# Patient Record
Sex: FEMALE | Race: WHITE | NOT HISPANIC OR LATINO | Employment: OTHER
[De-identification: names, ages, dates, MRNs, and addresses within clinical notes are randomized per-mention and may not be internally consistent; named-entity substitution may affect disease eponyms.]

---

## 2020-11-17 ENCOUNTER — RECORDS - HEALTHEAST (OUTPATIENT)
Dept: ADMINISTRATIVE | Facility: OTHER | Age: 30
End: 2020-11-17

## 2020-12-14 ENCOUNTER — RECORDS - HEALTHEAST (OUTPATIENT)
Dept: LAB | Facility: CLINIC | Age: 30
End: 2020-12-14

## 2020-12-15 LAB
ALLERGIC TO PENICILLIN: NO
GP B STREP DNA SPEC QL NAA+PROBE: NEGATIVE

## 2021-01-04 ENCOUNTER — RECORDS - HEALTHEAST (OUTPATIENT)
Dept: ADMINISTRATIVE | Facility: OTHER | Age: 31
End: 2021-01-04

## 2021-01-07 ENCOUNTER — ANESTHESIA - HEALTHEAST (OUTPATIENT)
Dept: OBGYN | Facility: CLINIC | Age: 31
End: 2021-01-07

## 2021-01-10 ENCOUNTER — HOME CARE/HOSPICE - HEALTHEAST (OUTPATIENT)
Dept: HOME HEALTH SERVICES | Facility: HOME HEALTH | Age: 31
End: 2021-01-10

## 2021-01-11 ENCOUNTER — HOME CARE/HOSPICE - HEALTHEAST (OUTPATIENT)
Dept: HOME HEALTH SERVICES | Facility: HOME HEALTH | Age: 31
End: 2021-01-11

## 2021-05-27 VITALS
RESPIRATION RATE: 16 BRPM | DIASTOLIC BLOOD PRESSURE: 72 MMHG | HEART RATE: 70 BPM | SYSTOLIC BLOOD PRESSURE: 134 MMHG | OXYGEN SATURATION: 98 % | TEMPERATURE: 98.1 F

## 2021-05-30 ENCOUNTER — HEALTH MAINTENANCE LETTER (OUTPATIENT)
Age: 31
End: 2021-05-30

## 2021-06-14 NOTE — ANESTHESIA POSTPROCEDURE EVALUATION
Patient: Yoly Adams  * No procedures listed *  Anesthesia type: No value filed.    Patient location: room  Last vitals: No vitals data found for the desired time range.    Post vital signs: stable  Level of consciousness: awake and responds to simple questions  Post-anesthesia pain: pain controlled  Post-anesthesia nausea and vomiting: no  Pulmonary: unassisted, return to baseline  Cardiovascular: stable and blood pressure at baseline  Hydration: adequate  Anesthetic events: no    QCDR Measures:  ASA# 11 - Christina-op Cardiac Arrest: ASA11B - Patient did NOT experience unanticipated cardiac arrest  ASA# 12 - Christina-op Mortality Rate: ASA12B - Patient did NOT die  ASA# 13 - PACU Re-Intubation Rate: ASA13B - Patient did NOT require a new airway mgmt  ASA# 10 - Composite Anes Safety: ASA10A - No serious adverse event    Additional Notes:

## 2021-06-14 NOTE — ANESTHESIA PROCEDURE NOTES
Epidural Block    Patient location during procedure: OB  Time Called: 1/7/2021 5:11 PM  Reason for Block:labor epidural  Staffing:  Performing  Anesthesiologist: Chun Morfin MD  Preanesthetic Checklist  Completed: patient identified, risks, benefits, and alternatives discussed, timeout performed, consent obtained, at patient's request, airway assessed, oxygen available, suction available, emergency drugs available and hand hygiene performed  Procedure  Patient position: sitting  Prep: ChloraPrep  Patient monitoring: continuous pulse oximetry, heart rate and blood pressure  Approach: midline  Location: L3-L4  Injection technique: SUSANNAH saline  Number of Attempts:1  Needle  Needle type: Estefany   Needle gauge: 18 G     Catheter in Space: 4 (4.5 cm to EDS)

## 2021-06-14 NOTE — ANESTHESIA PREPROCEDURE EVALUATION
Anesthesia Evaluation      Patient summary reviewed   No history of anesthetic complications     Airway   Mallampati: II  Neck ROM: full   Pulmonary - negative ROS                          Cardiovascular   Exercise tolerance: > or = 4 METS   Neuro/Psych    (+) anxiety/panic attacks,     Endo/Other    (+) obesity, pregnant     GI/Hepatic/Renal - negative ROS           Dental                         Anesthesia Plan  Planned anesthetic: epidural  LE  ASA 2     Anesthetic plan and risks discussed with: patient    Post-op plan: routine recovery

## 2021-06-16 ENCOUNTER — RECORDS - HEALTHEAST (OUTPATIENT)
Dept: LAB | Facility: CLINIC | Age: 31
End: 2021-06-16

## 2021-06-16 LAB
ALBUMIN SERPL-MCNC: 4.4 G/DL (ref 3.5–5)
ALP SERPL-CCNC: 85 U/L (ref 45–120)
ALT SERPL W P-5'-P-CCNC: 58 U/L (ref 0–45)
ANION GAP SERPL CALCULATED.3IONS-SCNC: 12 MMOL/L (ref 5–18)
AST SERPL W P-5'-P-CCNC: 35 U/L (ref 0–40)
BILIRUB SERPL-MCNC: 0.4 MG/DL (ref 0–1)
BUN SERPL-MCNC: 20 MG/DL (ref 8–22)
C REACTIVE PROTEIN LHE: <0.1 MG/DL (ref 0–0.8)
CALCIUM SERPL-MCNC: 9.8 MG/DL (ref 8.5–10.5)
CHLORIDE BLD-SCNC: 105 MMOL/L (ref 98–107)
CO2 SERPL-SCNC: 23 MMOL/L (ref 22–31)
CREAT SERPL-MCNC: 0.89 MG/DL (ref 0.6–1.1)
ERYTHROCYTE [SEDIMENTATION RATE] IN BLOOD BY WESTERGREN METHOD: 2 MM/HR (ref 0–20)
GFR SERPL CREATININE-BSD FRML MDRD: >60 ML/MIN/1.73M2
GLUCOSE BLD-MCNC: 139 MG/DL (ref 70–125)
POTASSIUM BLD-SCNC: 3.5 MMOL/L (ref 3.5–5)
PROT SERPL-MCNC: 7.4 G/DL (ref 6–8)
RHEUMATOID FACT SERPL-ACNC: <15 IU/ML (ref 0–30)
SODIUM SERPL-SCNC: 140 MMOL/L (ref 136–145)
TSH SERPL DL<=0.005 MIU/L-ACNC: 1.26 UIU/ML (ref 0.3–5)

## 2021-06-18 LAB — ANA SER QL: 0.3 U

## 2021-07-14 PROBLEM — Z34.90 PREGNANT: Status: RESOLVED | Noted: 2021-01-07 | Resolved: 2021-01-09

## 2021-07-23 ENCOUNTER — LAB REQUISITION (OUTPATIENT)
Dept: LAB | Facility: CLINIC | Age: 31
End: 2021-07-23

## 2021-07-23 DIAGNOSIS — R74.8 ABNORMAL LEVELS OF OTHER SERUM ENZYMES: ICD-10-CM

## 2021-07-23 DIAGNOSIS — R53.83 OTHER FATIGUE: ICD-10-CM

## 2021-07-23 LAB
ALBUMIN SERPL-MCNC: 4.4 G/DL (ref 3.5–5)
ALP SERPL-CCNC: 83 U/L (ref 45–120)
ALT SERPL W P-5'-P-CCNC: 45 U/L (ref 0–45)
ANION GAP SERPL CALCULATED.3IONS-SCNC: 9 MMOL/L (ref 5–18)
AST SERPL W P-5'-P-CCNC: 31 U/L (ref 0–40)
BILIRUB SERPL-MCNC: 0.4 MG/DL (ref 0–1)
BUN SERPL-MCNC: 23 MG/DL (ref 8–22)
CALCIUM SERPL-MCNC: 9.5 MG/DL (ref 8.5–10.5)
CHLORIDE BLD-SCNC: 106 MMOL/L (ref 98–107)
CO2 SERPL-SCNC: 23 MMOL/L (ref 22–31)
CREAT SERPL-MCNC: 0.84 MG/DL (ref 0.6–1.1)
GFR SERPL CREATININE-BSD FRML MDRD: >90 ML/MIN/1.73M2
GLUCOSE BLD-MCNC: 75 MG/DL (ref 70–125)
POTASSIUM BLD-SCNC: 3.8 MMOL/L (ref 3.5–5)
PROT SERPL-MCNC: 7.4 G/DL (ref 6–8)
SODIUM SERPL-SCNC: 138 MMOL/L (ref 136–145)
TSH SERPL DL<=0.005 MIU/L-ACNC: 1.9 UIU/ML (ref 0.3–5)

## 2021-07-23 PROCEDURE — 80053 COMPREHEN METABOLIC PANEL: CPT | Performed by: FAMILY MEDICINE

## 2021-07-23 PROCEDURE — 84443 ASSAY THYROID STIM HORMONE: CPT | Performed by: FAMILY MEDICINE

## 2021-09-19 ENCOUNTER — HEALTH MAINTENANCE LETTER (OUTPATIENT)
Age: 31
End: 2021-09-19

## 2021-11-03 ENCOUNTER — TRANSFERRED RECORDS (OUTPATIENT)
Dept: PHYSICAL THERAPY | Facility: CLINIC | Age: 31
End: 2021-11-03

## 2021-11-04 ENCOUNTER — TRANSCRIBE ORDERS (OUTPATIENT)
Dept: OTHER | Age: 31
End: 2021-11-04

## 2021-11-04 DIAGNOSIS — Q79.60 EDS (EHLERS-DANLOS SYNDROME): Primary | ICD-10-CM

## 2021-11-26 ENCOUNTER — THERAPY VISIT (OUTPATIENT)
Dept: OCCUPATIONAL THERAPY | Facility: CLINIC | Age: 31
End: 2021-11-26
Payer: COMMERCIAL

## 2021-11-26 DIAGNOSIS — M25.531 PAIN IN BOTH WRISTS: Primary | ICD-10-CM

## 2021-11-26 DIAGNOSIS — M79.642 PAIN IN BOTH HANDS: ICD-10-CM

## 2021-11-26 DIAGNOSIS — M25.532 PAIN IN BOTH WRISTS: Primary | ICD-10-CM

## 2021-11-26 DIAGNOSIS — M79.641 PAIN IN BOTH HANDS: ICD-10-CM

## 2021-11-26 PROCEDURE — 97166 OT EVAL MOD COMPLEX 45 MIN: CPT | Mod: GO | Performed by: OCCUPATIONAL THERAPIST

## 2021-11-26 PROCEDURE — 97535 SELF CARE MNGMENT TRAINING: CPT | Mod: GO | Performed by: OCCUPATIONAL THERAPIST

## 2021-11-26 NOTE — PROGRESS NOTES
Hand Therapy Initial Evaluation    Current Date:  11/26/2021    Diagnosis: Bilateral UE weakness, recent dx hEDS  Recent EDS Diagnosis  Orders 11/3/21    Precautions: EDS, MCAS    Subjective:  Yoly Adams is a 31 year old female.    Patient reports symptoms of the bilateral hands, UEs which occurred due to EDS. Since onset symptoms are rather stable, but always needing to manage things.      Answers for HPI/ROS submitted by the patient on 11/26/2021  Reason for Visit: Bilateral UE weakness, recent dx hEDS  When problem began:: 11/26/2021  How problem occurred:: Hereditary syndrome  Number scale: 3/10  General health as reported by patient: good  Please check all that apply to your current or past medical history: concussions/dizziness, depression, history of fractures, mental illness, migraines/headaches, numbness/tingling, osteoarthritis, pain at night/rest, weakness. Had DeQ bilateral on having her daughter  Medical allergies: other  Other Allergies Detail: Dx MCAS: mild-moderate reactions to a variety of materials  Surgeries: other  Other Surgery Detail: Endodontic  Medications you are currently taking: anti-depressants, anti-inflammatory, pain medication, sleep medication  Occupation:: Self employed, co owner of  business  What are your primary job tasks: computer work, driving, lifting/carrying, prolonged sitting, prolonged standing, pushing/pulling, repetitive tasks    Occupation: Pt is a Certified OT Assistant, working as a -owns business    Occupational Profile Information:  Right hand dominant  Prior functional level:  independent-shared household chores  Patient reports symptoms of pain and weakness/loss of strength  Special tests:  none.    Previous treatment: has some splints  Barriers include:none  Mobility: no devices, ankle instabilty  Transportation: drives  Currently working. 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  Needs to hand write for work around 2 hours at a time    Functional Outcome Measure:   Upper Extremity Functional Index Score:  SCORE:   Column Totals: /80: (P) 30   (A lower score indicates greater disability.)    Objective:    Pain Level (Scale 0-10)   11/26/2021   At Rest 3 today   At worst 10     Pain Description  Date 11/26/2021   Location wrist, hand and digits   Pain Quality Dull, Tender, Throbbing and Tiring, shooting with hands - kwaku with writing. Especially R wrist/FA/hand   Frequency constant     Pain is worst  daytime or nighttime   Exacerbated by  overuse   Relieved by cold, heat and biofreeze, arnica   Progression stable     Beighton Hypermobility Scale:9/9 per MD notes    Edema:  none of the  wrist, hand and thumb    Sensation   WNL throughout all nerve distributions; per patient report and some paresthesias at times    ROM:  NOTE: Wrist ROM is WNL all planes. Noted below is pain with end range  Wrist 11/26/2021   AROM (PROM)    Extension R:+  L:+   Flexion R:+  L:+   RD R:+  L:+   UD R:+  L:+   Supination R:+  L:+   Pronation R:  L:+ gets some spasms distal dorsal hand     ROM: Fingers: Noted to be  WNL but hypermobile    Hyperextension noted per joint:  Present = +/ Absent = -  11/26/2021  Hyperextension PIP DIP   Index R:+  L:+ R:+  L:+   MF R: +  L:+ R:+  L:+   RF R:+  L:+ R:+  L:+   Sf R:+  L:+ R:+  L:+   Thumb IP R:+  L:+     Thumb MP R:+  L:+       ROM  Thumb 11/26/2021 11/26/2021   AROM  (PROM) R L   MP hypermobile hypermobile   IP hypermobile hypermobile   RABD hypermobile hypermobile   PABD hypermobile hypermobile   Retropulsion     Kapandji Opposition Scale (0-10/10) Beyond 10 Beyond 10     Other: Does have hypermobile ankles, hips    Strength   (Measured in pounds, one trial only)  Pain Report: - none  + mild    ++ moderate     +++ severe    11/26/2021    R:79  L:63     Observation of writing: wrist ext noted, IPj HE, quadrupod wrap grap. Becomes sore to dorsal wrist      Assessment:  Patient presents with symptoms consistent with diagnosis of Right wrist and hand pain, pain of all joints of the upper extremities, and generalized hypermobility,  with conservative intervention.     Patient's limitations or Problem List includes:  Pain, Hypermobility and Tightness in musculature of the bilateral elbow, wrist, hand, thumb and fingers which interferes with the patient's ability to perform Self Care Tasks (all), Work Tasks, Sleep Patterns, Recreational Activities, Household Chores and Driving  as compared to previous level of function.    Rehab Potential:  Good - Return to full activity, some limitations    Patient will benefit from skilled Occupational Therapy to increase stability and decrease pain to return to previous activity level and resume normal daily tasks and to reach their rehab potential.    Barriers to Learning:  No barrier    Communication Issues:  Patient appears to be able to clearly communicate and understand verbal and written communication and follow directions correctly.    Chart Review: Expanded review of medical and/or therapy records and additional review of physical, cognitive or psychosocial history related to current functional performance and Detailed history review with patient    Identified Performance Deficits: bathing/showering, dressing, functional mobility, hygiene and grooming, driving and community mobility, health management and maintenance, home establishment and management, meal preparation and cleanup, sleep, work, leisure activities.    Assessment of Occupational Performance:  5 or more Performance Deficits    Clinical Decision Making (Complexity): Moderate complexity    Treatment Explanation:  The following has been discussed with the patient:  RX ordered/plan of care  Anticipated  outcomes  Possible risks and side effects    Plan:  Frequency:  1 X week, once daily  Duration:  for 4 weeks tapering to 2 X a month over 8 weeks    Treatment Plan:   Modalities:  Fluidotherapy and Paraffin  Therapeutic Exercise:  AROM, Isometrics and Stabilization  Neuromuscular re-education:  Nerve Gliding, Proprioceptive Training and Posture  Manual Techniques:  Myofascial release and STM  Orthotic Fabrication:  Static, Finger based, Hand based, Forearm based  Self Care:  Self Care Tasks, Ergonomic Considerations, Work Tasks    Discharge Plan:  Achieve all LTG.  Independent in home treatment program.  Reach maximal therapeutic benefit.    Home Program:    Difficult activities noted:  Pt needs to do writing during consults, uses laptop, ipad.  Needs to hand write for work around 2 hours at a time    Patient goals:  General strengthening, writing has always been an issue  Would like to have an active hobby, even indoor golf etc    Pain management  Uses heat, ice, biofreeze, arnica    Orthoses:  Sleeps with soft splints  Purchased a sizing kit for oval 8s  Parag straps help  Has some thumb splints that cross the wrist    Adaptive aides:  Uses a pen again - still uses it with a wrap   SI belt  Stair runners for stairs    Joint Protection:  Has changed how she holds her phone    Exercises / stabilization:  TBD    Modifications   Try dictation on phone?  Word prediction on ipad?  Voice typing in [employer]  Shower stool? Lean to side to wash hair  Possible fist  on pen  Possible roll under FA    Activities:  Used to play tennis - would like to do an active activity, maybe even putting golf in basement.  May try some pilates    Next Visit:  Look further into modifications  Look at PIP joint orthoses/oval 8/SRS  Possible wrist orthoses?                                 .

## 2021-12-06 ENCOUNTER — THERAPY VISIT (OUTPATIENT)
Dept: OCCUPATIONAL THERAPY | Facility: CLINIC | Age: 31
End: 2021-12-06
Payer: COMMERCIAL

## 2021-12-06 DIAGNOSIS — M25.532 PAIN IN BOTH WRISTS: Primary | ICD-10-CM

## 2021-12-06 DIAGNOSIS — M25.531 PAIN IN BOTH WRISTS: Primary | ICD-10-CM

## 2021-12-06 PROCEDURE — 97535 SELF CARE MNGMENT TRAINING: CPT | Mod: GO | Performed by: OCCUPATIONAL THERAPIST

## 2021-12-06 PROCEDURE — 97760 ORTHOTIC MGMT&TRAING 1ST ENC: CPT | Mod: GO | Performed by: OCCUPATIONAL THERAPIST

## 2021-12-06 NOTE — PROGRESS NOTES
"SOAP note information for 12/6/2021.  Please refer to the daily flowsheet for treatment today, total treatment time and time spent performing 1:1 timed codes.          Measures for Finger 3 point splints (brand: Oval 8 3 Point Products)  *=customized orthosis  12/6/2021 Right Left   Thumb IP: IP:   Thumb MP: MP:   Index PIP:7 (modified the oval 8 with heat gun for correct fit) PIP:6 (modified the oval 8 with heat gun for correct fit)   Middle PIP:7\" PIP:7 \"   Ring PIP : PIP:   Small PIP: PIP:            Home Program:    Difficult activities noted:  Pt needs to do writing during consults, uses laptop, ipad.  Needs to hand write for work around 2 hours at a time, puts hand notes into Drive (scans)  Also needs to text, email clients alot    Patient goals:  General strengthening, writing has always been an issue  Would like to have an active hobby, even indoor golf etc    Pain management  Uses heat, ice, biofreeze, arnica, got some voltaren  Suggested small protex gloves or medium iMak gloves  KT 1st dorsal comp    Orthoses:  Sleeps with soft splints  Purchased a sizing kit for oval 8s  Parag straps help  Has some thumb splints that cross the wrist  B custom hand based thumb support orthoses - thumb IPj free    Adaptive aides:  Uses a pen again - still uses it with a wrap   SI belt  Stair runners for stairs  Foam tubing does work for writing implements - hold with fist  Asked for a external keyboard for ipad  Prop luke on wedge pillow, try having the luke reading rather than tap    Joint Protection:  Has changed how she holds her phone, using dictation now (mainly with friends)    Exercises / stabilization:  TBD    Modifications   Shower stool? Lean to side to wash hair  using fist  on pen with foam tubing  Possible roll under FA    Activities:  Used to play tennis - would like to do an active activity, maybe even putting golf in basement.  May try some pilates    Next Visit:  exercises  Look further into " modifications  Activity to do for recreation  Look at SRS depending on response to oval 8s  Possible wrist orthoses?

## 2021-12-07 ENCOUNTER — HOSPITAL ENCOUNTER (OUTPATIENT)
Dept: PHYSICAL THERAPY | Facility: CLINIC | Age: 31
Setting detail: THERAPIES SERIES
End: 2021-12-07
Attending: FAMILY MEDICINE
Payer: COMMERCIAL

## 2021-12-07 DIAGNOSIS — Q79.60 EDS (EHLERS-DANLOS SYNDROME): ICD-10-CM

## 2021-12-07 PROCEDURE — 97161 PT EVAL LOW COMPLEX 20 MIN: CPT | Mod: GP | Performed by: PHYSICAL THERAPIST

## 2021-12-07 PROCEDURE — 97110 THERAPEUTIC EXERCISES: CPT | Mod: GP | Performed by: PHYSICAL THERAPIST

## 2021-12-07 PROCEDURE — 97535 SELF CARE MNGMENT TRAINING: CPT | Mod: GP | Performed by: PHYSICAL THERAPIST

## 2021-12-07 NOTE — DISCHARGE INSTRUCTIONS
Consider getting swing open christian instead of step over ones.    Could try using TENS for back pain    Use a towel roll to support you neck when you are on your back        Orthotics - likely will need B hinged AFOs  Please call the location that is most convenient to you to schedule.    [facility]: [phone]  Orthotists: Abhijeet Borja & Yennifer ZARCO [facility]  [address] , [address]  [place] [address]    [facility]: [phone]  Orthotist: Manas ZARCO [facility]  [address] [place] [address]     [facility]: [phone]  Orthotist: Bairon ZARCO [facility]  [address], [address]  [place] [address]    [facility]: [phone]  Orthotists: Maninder Doherty & Dustin ZARCO [facility] [place]  [address]  [place] [address]    [facility]: [phone]  Orthotist: Oracio ZARCO [facility]  [address], [address]  [place] [address]      [employer]  Orthotist: Eliana Radford  Office: [phone] - call to schedule at any site    Monday: [facility]  Wednesday: [facility]  Tuesday, Thursday, Friday: Lenora MALAVE [facility]  [address], [address], [facility] [place] [address])

## 2021-12-09 NOTE — PROGRESS NOTES
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 Pt notes that she is quite deconditioned, and had a very rough pregnancy and recovery from pregnancy. 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 She notes that she needs to work on establishing good self care strategies, build up strength. Notes some anxiety about anything physical - has had bad reactions in the past when she does too much. Realizes that she needs to be a better/more vocal advocate for herself. Per recommendation from Dr Melendrez, is now taking daily baths 15 mins. She has a fear of sliding down the stairs - hardwood, slippery. Notes high fall history on level ground, generally clumsy. PMHx: ADD, adjustment disorder, disordered eating, PTSD, challenging pregnancy, multiple R ankle fx.   Patient role/Employment history Employed;Student  (try to take monday off, 3 overnights/week, 60hrs/week.)   Living environment House/[facility]   Home/Community Accessibility Comments  Martir, 11mo daugher Kristy, 2 dogs. stairs - steep, lots of them. Laundry in basement -  does it, she will fold. Has dog hcristian everywhere that she steps over.   Assistive Devices Comments knee high compression socks (2018), SI belt (serola), wrist support with thumb stabilizer, knee braces (soft, no metal/hinge), B lace up ankle supports with fig 8 straps. Doesn't have anything for elbows. Has oval 8s for all fingers, has thumb spica.    Patient/Family Goals Statement I know that I am very deconditioned. When I know that  I will be doing something physical I get nervous. Mondays: Chiro, hand therapy, massage.    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 Exercise: pool aerobics - after 1st time couldn't get out of bed for 4 days.   Fall Risk Screen   Fall screen completed by PT   Have you fallen 2 or more times in the past year? Yes   Have you fallen and had an injury in the past year? No   Is patient a fall risk? Yes   Fall screen comments Per clinical judgement and history of falls. Take a fall at least 1x per week. Moving too fast, ankles are very wobbly, constantly bump into things, go around corners.    Abuse Screen (yes response referral indicated)   Feels Unsafe at Home or Work/School no   Feels Threatened by Someone no   Does Anyone Try to Keep You From Having Contact with Others or Doing Things Outside Your Home? no   Physical Signs of Abuse Present no   Pain   Patient currently in pain Yes   Pain location back pain now 6/10, worst 8/10 (end of day, radiates into hips), best 5/10 (massage, heat, ice, biofreeze, advil spray), avg 6.3/10. Lower lumbar.   Pain comments Neck pain now 4/10, worst 5/10 (Reading on phone in bed), best 4/10, avg 4.3/10. B shoulders 4/10, worst 5/10 (holding daughter, shampooing hair, reaching overhead), best 2/10 (bath, water aerobics, heat, rest), avg 3.7/10.   Vitals Signs   Heart Rate 80   Blood Pressure 119/80   Vital Signs Comments Sitting frontal headache, little dizziness, 126/91, HR 89. Standing 122/85, HR 85, faint/wobbly.    Cognitive Status Examination   Orientation orientation to person, place and time   Level of Consciousness alert   Follows Commands and Answers Questions 100% of the time;able to follow multistep instructions   Personal Safety and Judgment intact   Memory intact   Cognitive Comment Completed history almost entirely in laying down position to assist  "with brain fog.   Posture   Posture Forward head position;Protracted shoulders   Posture Comments pelvic landmarks even although ant pelvic tilt. Profound swayback posture in standing with B knee hyperextension, passive ant locking of hips. Pt with B significant pronation with weight bearing - R more in forefoot compared to L.   Palpation   Palpation while palpating down spine, noted ~1/4\" diameter soft squishy and shiftable mass at ~C4. Manipulating this mass did not provoke sx of pain or discomfort for pt. She was not aware it was there. Pt also with notable spondylolithesis in lumbar spine at ~L2-4 per palpation. This does correspond to area of pain in her back.    Range of Motion (ROM)   ROM Comment Beighton 9/9. Increased hindfoot inversion L>R. Increased mobility in B forefeet with significant splaying with weight bearing.   Strength   Strength Comments Not formally assessed today.    Transfer Skills   Transfer Comments Pt is able to stand without use of UEs although gets symptommatic of headache, lightheaded with standing. This feeling is one of her primary concerns to be addressed in PT.   Gait   Gait Comments Pt amb with swayback posture, hard hit of heels with ambulation, increased B knee ext with stance phase, increased pelvic vertical mobility with ambulation.   Gait Special Tests   Gait Special Tests 25 FOOT TIMED WALK   Gait Special Tests 25 Foot Timed Walk   Seconds 8.28   Comments no AD   Balance   Balance Comments Not formally assessed today due to time constraints.   Modality Interventions   Planned Modality Interventions TENS;Cryotherapy   Planned Therapy Interventions   Planned Therapy Interventions balance training;neuromuscular re-education;orthotic fitting/training;ROM;strengthening;manual therapy;transfer training;gait training   Clinical Impression   Criteria for Skilled Therapeutic Interventions Met yes, treatment indicated   PT Diagnosis Deconditioning in context of diffuse joint " hypermobility and chronic pain.   Influenced by the following impairments Impaired posture, chronic diffuse pain (primarily neck and lumbar), diffuse joint hypermobility.   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   Clinical Presentation Evolving/Changing   Clinical Presentation Rationale slow progressive nature of condition, acutely exacerbated with pregnancy in the last ~1.5 years.   Clinical Decision Making (Complexity) Low complexity   Therapy Frequency 1 time/week   Predicted Duration of Therapy Intervention (days/wks) 90 days   Risk & Benefits of therapy have been explained Yes   Patient, Family & other staff in agreement with plan of care Yes   Clinical Impression Comments Will reevaluate tolerance to PT intervention at 90 days with likely extension for 6-12 months depending on needs.   Goal 1   Goal Identifier Shoulder reaching   Goal Description Pt report improved B shoulder stability with reaching overhead for activities such as washing hair, reaching for objects in cabinets via avg shoulder pain <2.5/10.   Target Date 03/06/22   Goal 2   Goal Identifier Neck pain   Goal Description Pt report decreased average neck pain to <3.3/10 as result of improved strength and stability, improved posture and ergonomics/compensation strategies.   Target Date 03/06/22   Goal 3   Goal Identifier Standing tolerance   Goal Description Pt report use of at least 3 strategies for managing her dizziness that is experienced with positional changes and prolonged standing.   Target Date 03/06/22   Goal 4   Goal Identifier Back pain   Goal Description Pt report decreased back pain to <5/10 as result of improved strength and stability, use of compensation strategies/devices, as well as improved body mechanics throughout her day.   Target Date 03/06/22   Goal 5   Goal Identifier HEP   Goal Description Pt report indep with  HEP to be completed at least 5 days per week for strength, stability, balance and endurance training for ongoing wellness in context of EDS.   Target Date 03/06/22   Goal 6   Goal Identifier Sleep   Goal Description Pt report improved sleep with less stiffness/pain in AM as result of use of new positioning supports/postures.   Target Date 03/06/22   Total Evaluation Time   PT Eval, Low Complexity Minutes (99061) 50       Anisa Payton, DPT, NCS  Physical Therapist     [facility] [place] [address]  [place] [address]  [email or web address]  Office: [phone]  Fax: [phone]  Gender Pronouns: she/her  Employed by [employer]

## 2021-12-13 ENCOUNTER — THERAPY VISIT (OUTPATIENT)
Dept: OCCUPATIONAL THERAPY | Facility: CLINIC | Age: 31
End: 2021-12-13
Payer: COMMERCIAL

## 2021-12-13 DIAGNOSIS — M25.532 PAIN IN BOTH WRISTS: Primary | ICD-10-CM

## 2021-12-13 DIAGNOSIS — M25.531 PAIN IN BOTH WRISTS: Primary | ICD-10-CM

## 2021-12-13 PROCEDURE — 97535 SELF CARE MNGMENT TRAINING: CPT | Mod: GO | Performed by: OCCUPATIONAL THERAPIST

## 2021-12-13 NOTE — PROGRESS NOTES
Measures for Finger SILVER RING orthoses:  Finger sizes:  12/13/2021 Left  proximal   distal Right  proximal   distal   Index PIP   10.5 9   Middle PIP   10 9   Ring PIP       Small PIP          Measures for Finger SILVER RING orthoses:  Finger sizes:  12/13/2021 Left  proximal   distal Right  proximal   distal   Index DIP   7.5 7.5   Middle  DIP       Ring  DIP       Small  DIP              Home Program:    Difficult activities noted:  Pt needs to do writing during consults, uses laptop, ipad.  Needs to hand write for work around 2 hours at a time, puts hand notes into Drive (scans)  Also needs to text, email clients alot    Patient goals:  General strengthening, writing has always been an issue  Would like to have an active hobby, even indoor golf etc    Pain management  Uses heat, ice, biofreeze, arnica, got some voltaren  Suggested small protex gloves or medium iMak gloves  KT 1st dorsal comp- does not stay on (needs it tacked below the MPj)    Orthoses/ Gloves:   Sleeps with soft splints  Sized for oval 8s - to start with B IF, MF (going fine, can come off - foam helps)  Parag straps help  Has some thumb splints that cross the wrist  B custom hand based thumb support orthoses - thumb IPj free  imak gloves size M - most of the day (help with writing)  May look at compression sleeve for elbow (R)    Adaptive aides:  Uses a pen again - still uses it with a wrap   SI belt  Stair runners for stairs  Foam tubing does work for writing implements - hold with fist  Asked for a external keyboard for ipad  Prop luke on wedge pillow, try having the luke reading rather than tap    Joint Protection:  Has changed how she holds her phone, using dictation now (mainly with friends)    Exercises / stabilization:  TBD    Modifications   Shower stool? Lean to side to wash hair  using fist  on pen with foam tubing  Possible roll under FA    Activities:  Used to play tennis - would like to do an active activity, maybe  even putting golf in basement.  May try some pilates    Next Visit:  exercises  Look further into modifications  Activity to do for recreation  Look at SRS depending on response to oval 8s  Possible wrist orthoses?                  .

## 2021-12-20 ENCOUNTER — THERAPY VISIT (OUTPATIENT)
Dept: OCCUPATIONAL THERAPY | Facility: CLINIC | Age: 31
End: 2021-12-20
Payer: COMMERCIAL

## 2021-12-20 DIAGNOSIS — M25.531 PAIN IN BOTH WRISTS: Primary | ICD-10-CM

## 2021-12-20 DIAGNOSIS — M25.532 PAIN IN BOTH WRISTS: Primary | ICD-10-CM

## 2021-12-20 PROCEDURE — 97112 NEUROMUSCULAR REEDUCATION: CPT | Mod: GO | Performed by: OCCUPATIONAL THERAPIST

## 2021-12-20 PROCEDURE — 97535 SELF CARE MNGMENT TRAINING: CPT | Mod: GO | Performed by: OCCUPATIONAL THERAPIST

## 2021-12-20 NOTE — PROGRESS NOTES
SOAP note information for 12/20/2021.  Please refer to the daily flowsheet for treatment today, total treatment time and time spent performing 1:1 timed codes.         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  Needs to hand write for work around 2 hours at a time, puts hand notes into Drive (scans)  Also needs to text, email clients alot    Patient goals:  General strengthening, writing has always been an issue  Would like to have an active hobby, even indoor golf etc    Pain management  Uses heat, ice, biofreeze, arnica, got some voltaren  Suggested small protex gloves or medium iMak gloves  KT 1st dorsal comp- does not stay on (needs it tacked below the MPj)    Orthoses:  Sleeps with soft splints  Sized for oval 8s - to start with B IF, MF (going fine, can come off - foam helps)  Has some thumb splints that cross the wrist  B custom hand based thumb support orthoses - thumb IPj free    Gloves/sleeve/straps/compression:  imak gloves size M - most of the day (help with writing)  May look at compression sleeve for elbow (R) O&P can fit, or order online.  Parag straps help    Adaptive aides:  Uses a pen again - still uses it with a wrap   SI belt  Stair runners for stairs  Foam tubing does work for writing implements - hold with fist  Asked for a external keyboard for ipad  Prop luke on wedge pillow, try having the luke reading rather than tap    Joint Protection:  Has changed how she holds her phone, using dictation now (mainly with friends)    Exercises /  stabilization:  Started finger stability exercises - finger tips together, lift up one at a time (maybe) or press into leg    Modifications   Alternating bath w shower  using fist  on pen with foam tubing  Possible roll under FA    Activities:  Used to play tennis - would like to do an active activity, maybe even putting golf in basement.  May try some pilates    Next Visit:  exercises  Look further into modifications  Activity to do for recreation  Possible wrist orthoses?            .

## 2021-12-22 ENCOUNTER — HOSPITAL ENCOUNTER (OUTPATIENT)
Dept: PHYSICAL THERAPY | Facility: CLINIC | Age: 31
Setting detail: THERAPIES SERIES
End: 2021-12-22
Attending: FAMILY MEDICINE
Payer: COMMERCIAL

## 2021-12-22 PROCEDURE — 97750 PHYSICAL PERFORMANCE TEST: CPT | Mod: GP | Performed by: PHYSICAL THERAPIST

## 2021-12-22 PROCEDURE — 97110 THERAPEUTIC EXERCISES: CPT | Mod: GP | Performed by: PHYSICAL THERAPIST

## 2021-12-22 NOTE — DISCHARGE INSTRUCTIONS
12/22/21   - Try showering with water shoes   - Cano order shower bench off [employer], just measure dimensions of tub beforehand.

## 2021-12-27 ENCOUNTER — THERAPY VISIT (OUTPATIENT)
Dept: OCCUPATIONAL THERAPY | Facility: CLINIC | Age: 31
End: 2021-12-27
Payer: COMMERCIAL

## 2021-12-27 DIAGNOSIS — M25.531 PAIN IN BOTH WRISTS: Primary | ICD-10-CM

## 2021-12-27 DIAGNOSIS — M25.532 PAIN IN BOTH WRISTS: Primary | ICD-10-CM

## 2021-12-27 PROCEDURE — 97535 SELF CARE MNGMENT TRAINING: CPT | Mod: GO | Performed by: OCCUPATIONAL THERAPIST

## 2021-12-27 PROCEDURE — 97763 ORTHC/PROSTC MGMT SBSQ ENC: CPT | Mod: GO | Performed by: OCCUPATIONAL THERAPIST

## 2021-12-27 NOTE — PROGRESS NOTES
SOAP note information for 12/27/2021.  Please refer to the daily flowsheet for treatment today, total treatment time and time spent performing 1:1 timed codes.          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  Needs to hand write for work around 2 hours at a time, puts hand notes into Drive (scans)  Also needs to text, email clients alot    Patient goals:  General strengthening, writing has always been an issue  Would like to have an active hobby, even indoor golf etc    Pain management  Uses heat, ice, biofreeze, arnica, got some voltaren  Suggested small protex gloves or medium iMak gloves  KT 1st dorsal comp- does not stay on (needs it tacked below the MPj)    Orthoses:  Sleeps with soft splints  Oval 8s - for all PIPj, and thumb IPs. B IF, MF connected via tape  Has some thumb splints that cross the wrist  B custom hand based thumb support orthoses - thumb IPj free  B IF, MF gutter orthosis to wear at night, under gloves (PIP/DIP included)    Gloves/sleeve/straps/compression:  imak gloves size M - most of the day (help with writing)  May look at compression sleeve for elbow (R) O&P can fit, or order online.  Parag straps help    Adaptive aides:  Uses a pen again - still uses it with a wrap   SI belt  Stair runners for stairs  Foam tubing does work for writing implements - hold with fist  Asked for a external keyboard for ipad  Prop luke on wedge pillow, try having the luke reading rather than tap    Joint Protection:  Has changed how she holds her phone, using dictation now (mainly  with friends)    Exercises / stabilization:  Started finger stability exercises - finger tips together, lift up one at a time (maybe) or press into leg    Modifications   Alternating bath w shower  using fist  on pen with foam tubing  Possible roll under FA    Activities:  Used to play tennis - would like to do an active activity, maybe even putting golf in basement.  May try some pilates  Took photos of yoga box cards    Next Visit:  exercises  Look further into modifications  Activity to do for recreation  Possible wrist orthoses?

## 2021-12-28 ENCOUNTER — TRANSCRIBE ORDERS (OUTPATIENT)
Dept: OTHER | Age: 31
End: 2021-12-28
Payer: COMMERCIAL

## 2021-12-28 DIAGNOSIS — Z98.890 H/O NASAL SEPTOPLASTY: Primary | ICD-10-CM

## 2022-01-05 ENCOUNTER — TELEPHONE (OUTPATIENT)
Dept: OTOLARYNGOLOGY | Facility: CLINIC | Age: 32
End: 2022-01-05
Payer: COMMERCIAL

## 2022-01-05 NOTE — TELEPHONE ENCOUNTER
LVM that patient has a referral to Dr. Schmidt for chronic sinusitis for potential septoplasty surgery. Dr Schmidt doesn't see for this diagnosis. If patient would still like to schedule with him we can set that up but he will just refer to [facility] instead.    Patient can bypass this and schedule first appt with Nataliia or Law and not see Roxana to save some time.       Gave call center number with options

## 2022-01-06 DIAGNOSIS — G47.00 INSOMNIA: Primary | ICD-10-CM

## 2022-01-06 DIAGNOSIS — G47.19 EXCESSIVE DAYTIME SLEEPINESS: ICD-10-CM

## 2022-01-12 ENCOUNTER — HOSPITAL ENCOUNTER (OUTPATIENT)
Dept: PHYSICAL THERAPY | Facility: CLINIC | Age: 32
Setting detail: THERAPIES SERIES
End: 2022-01-12
Attending: FAMILY MEDICINE
Payer: COMMERCIAL

## 2022-01-12 PROCEDURE — 97530 THERAPEUTIC ACTIVITIES: CPT | Mod: GP | Performed by: PHYSICAL THERAPIST

## 2022-01-12 PROCEDURE — 97535 SELF CARE MNGMENT TRAINING: CPT | Mod: GP | Performed by: PHYSICAL THERAPIST

## 2022-01-12 PROCEDURE — 97140 MANUAL THERAPY 1/> REGIONS: CPT | Mod: GP | Performed by: PHYSICAL THERAPIST

## 2022-01-12 NOTE — DISCHARGE INSTRUCTIONS
01/12/22   - At work: try using your SI belt for your 4 hour shift. Wear to your tolerance. Potential to try cervical collar for long overnight shifts when you know you need to document for extended periods of time.   - Sleeping: try tolling up a towel and place in your pillowcase to improve neck support and pain. Can try getting a body pillow or use pregnancy pillow and lay in 1/2 side lying position, leaning back on the pillow for support.

## 2022-01-19 ENCOUNTER — THERAPY VISIT (OUTPATIENT)
Dept: OCCUPATIONAL THERAPY | Facility: CLINIC | Age: 32
End: 2022-01-19
Payer: COMMERCIAL

## 2022-01-19 DIAGNOSIS — M25.532 PAIN IN BOTH WRISTS: Primary | ICD-10-CM

## 2022-01-19 DIAGNOSIS — M25.531 PAIN IN BOTH WRISTS: Primary | ICD-10-CM

## 2022-01-19 PROCEDURE — 97763 ORTHC/PROSTC MGMT SBSQ ENC: CPT | Mod: GO | Performed by: OCCUPATIONAL THERAPIST

## 2022-01-19 PROCEDURE — 97535 SELF CARE MNGMENT TRAINING: CPT | Mod: GO | Performed by: OCCUPATIONAL THERAPIST

## 2022-01-19 NOTE — PROGRESS NOTES
SOAP note information for 1/19/2022.  Please refer to the daily flowsheet for treatment today, total treatment time and time spent performing 1:1 timed codes.       Subjective:  Fingers have been less sore. Has had bilateral lateral elbow pain, its dull and a baseline always there now. Tender with rubbing but feels nice.  Sleeps with hands overhead or under pillow.    Objective:  R elbow  TTP to to superior and inferior portion of LEP. Most tender just inferior to the LEP in soft space, and at PIN site.  L elbow - similar to the R but not as sore.     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  Needs to hand write for work around 2 hours at a time, puts hand notes into Drive (scans)  Also needs to text, email clients alot    Patient goals:  General strengthening, writing has always been an issue  Would like to have an active hobby, even indoor golf etc    Pain management  Uses heat, ice, biofreeze, arnica, got some voltaren  Suggested small protex gloves or medium iMak gloves  KT 1st dorsal comp- does not stay on (needs it tacked below the MPj)    Orthoses:  Sleeps with soft splints  Oval 8s - for all PIPj, and thumb IPs. B IF, MF connected via tape  Has some thumb splints that cross the wrist  B custom hand based thumb support orthoses - thumb IPj free  B IF, MF gutter orthosis to wear at night, under gloves (PIP/DIP included)    Gloves/sleeve/straps/compression:  imak gloves size M - most of the day (help with writing)  May look at compression sleeve for elbow (R) O&P can fit, or order online.  Parag straps help    Adaptive aides:  Uses a pen again - still uses it with a wrap   SI belt  Stair runners for stairs  Foam tubing does work for writing implements - hold with fist  Asked for a external keyboard for  ipad  Prop luke on wedge pillow, try having the luke reading rather than tap    Joint Protection:  Has changed how she holds her phone, using dictation now (mainly with friends)    Exercises / stabilization:  Started finger stability exercises - finger tips together, lift up one at a time (maybe) or press into leg    Modifications   Alternating bath w shower  using fist  on pen with foam tubing  Possible roll under FA    Activities:  Used to play tennis - would like to do an active activity, maybe even putting golf in basement.  May try some pilates  Took photos of yoga box cards    Next Visit:  exercises  Look further into modifications  Activity mods, recreation  Possible wrist orthoses?

## 2022-01-21 NOTE — TELEPHONE ENCOUNTER
FUTURE VISIT INFORMATION      FUTURE VISIT INFORMATION:    Date: 3/15/22    Time: 9:15AM    Location: [facility] [place]  REFERRAL INFORMATION:    Referring provider:  Zainab Melendrez MD    Referring providers clinic:  [facility]    Reason for visit/diagnosis  H/O nasal septoplasty - per Law's note ok to sched w/Dr. Schmidt, pt aware he does not see for this Dx per pt    RECORDS REQUESTED FROM:       Clinic name Comments Records Status Imaging Status   [facility] 11/3/21 note from Zainab Melendrez MD Scanned in [facility] [place]  2/25/22 note from Albert Cuellar MD   EPIC

## 2022-01-26 ENCOUNTER — THERAPY VISIT (OUTPATIENT)
Dept: OCCUPATIONAL THERAPY | Facility: CLINIC | Age: 32
End: 2022-01-26
Payer: COMMERCIAL

## 2022-01-26 ENCOUNTER — HOSPITAL ENCOUNTER (OUTPATIENT)
Dept: PHYSICAL THERAPY | Facility: CLINIC | Age: 32
Setting detail: THERAPIES SERIES
End: 2022-01-26
Attending: FAMILY MEDICINE
Payer: COMMERCIAL

## 2022-01-26 DIAGNOSIS — M25.531 PAIN IN BOTH WRISTS: Primary | ICD-10-CM

## 2022-01-26 DIAGNOSIS — M25.532 PAIN IN BOTH WRISTS: Primary | ICD-10-CM

## 2022-01-26 PROCEDURE — 97530 THERAPEUTIC ACTIVITIES: CPT | Mod: GP | Performed by: PHYSICAL THERAPIST

## 2022-01-26 PROCEDURE — 97112 NEUROMUSCULAR REEDUCATION: CPT | Mod: GO | Performed by: OCCUPATIONAL THERAPIST

## 2022-01-26 PROCEDURE — 97535 SELF CARE MNGMENT TRAINING: CPT | Mod: GO | Performed by: OCCUPATIONAL THERAPIST

## 2022-01-26 NOTE — PROGRESS NOTES
Hand Therapy Progress Note  Please refer to the daily flowsheet for treatment today, total treatment time and time spent performing 1:1 timed codes.       Current Date:  1/26/2022    Diagnosis: Bilateral UE weakness, recent dx hEDS  Recent EDS Diagnosis  Orders 11/3/21    Precautions: EDS, MCAS, does not have POTS per tilt table test today.    Subjective:  Fingers have been fine, wearing all the things. Elbow pain is better.    Activities affected:  11/26/2021 Pt needs to do writing during consults, uses laptop, ipad.  Needs to hand write for work around 2 hours at a time  1/26/2022  Doing better, as far as making modifications and pacing, pain prevention.    Objective:    Pain Level (Scale 0-10)   11/26/2021 1/26/2022     At Rest 3 today mild   At worst 10 10     Pain Description  Date 11/26/2021 1/26/2022     Location wrist, hand and digits B radial wrists. Elbow has been a bit better. Fingers - managing well   Pain Quality Dull, Tender, Throbbing and Tiring, shooting with hands - kwaku with writing. Especially R wrist/FA/hand    Frequency constant      Pain is worst  daytime or nighttime    Exacerbated by  overuse    Relieved by cold, heat and biofreeze, arnica    Progression stable      Beighton Hypermobility Scale:9/9 per MD notes    Edema:  none of the  wrist, hand and thumb    Sensation   WNL throughout all nerve distributions; per patient report and some paresthesias at times    ROM:  NOTE: Wrist ROM is WNL all planes. Noted below is pain with end range  Wrist 11/26/2021   AROM (PROM)    Extension R:+  L:+   Flexion R:+  L:+   RD R:+  L:+   UD R:+  L:+   Supination R:+  L:+   Pronation R:  L:+ gets some spasms distal dorsal hand     ROM: Fingers: Noted to be  WNL but hypermobile    Hyperextension noted per joint:  Present = +/ Absent = -  11/26/2021  Hyperextension PIP DIP   Index R:+  L:+ R:+  L:+   MF R: +  L:+ R:+  L:+   RF R:+  L:+ R:+  L:+   Sf R:+  L:+ R:+  L:+   Thumb IP R:+  L:+     Thumb MP R:+  L:+        ROM  Thumb 11/26/2021 11/26/2021   AROM  (PROM) R L   MP hypermobile hypermobile   IP hypermobile hypermobile   RABD hypermobile hypermobile   PABD hypermobile hypermobile   Retropulsion     Kapandji Opposition Scale (0-10/10) Beyond 10 Beyond 10     Other: Does have hypermobile ankles, hips    Strength   (Measured in pounds, one trial only)  Pain Report: - none  + mild    ++ moderate    +++ severe    11/26/2021    R:79  L:63     Writing: going better, trying to modify, dictate    Assessment:  Response to therapy has been improvement to:  self management of symptoms and pain management.    Overall Assessment:  Patient is progressing well and is ready to decrease frequency of treatment in the clinic.  STG/LTG:  STGoals have been reviewed and progress or achievement has occurred;  see goal sheet for details and updates.    Plan:  Frequency/Duration:  Recommend continuing to see patient  2 X a month, once daily  for 2 months  Appropriateness of Rx I have re-evaluated this patient and find that the nature, scope, duration and intensity of the therapy is appropriate for the medical condition of the patient.    Treatment Plan:  Continue treatment plan as outlined in initial evaluation    Home Program:    Difficult activities noted:  Pt needs to do writing during consults, uses laptop, ipad.  Needs to hand write for work around 2 hours at a time, puts hand notes into Drive (scans)  Also needs to text, email clients alot    Patient goals:  General strengthening, writing has always been an issue  Would like to have an active hobby - loves tennis, waterskiing    Pain management  Uses heat, ice, biofreeze, arnica, got some voltaren  Suggested small protex gloves or medium iMak gloves  KT 1st dorsal comp- does not stay on (needs it tacked below the MPj)    Orthoses:  Sleeps with soft splints  Oval 8s - for all PIPj, and thumb IPs. B IF, MF connected via tape  Has some thumb splints that cross the wrist  B custom hand  based thumb support orthoses - thumb IPj free  B IF, MF gutter orthosis to wear at night, under gloves (PIP/DIP included)    Gloves/sleeve/straps/compression:  imak gloves size M - most of the day (help with writing)  May look at compression sleeve for elbow (R) O&P can fit, or order online.  Parag straps help    Adaptive aides:  Uses a pen again - still uses it with a wrap   SI belt  Stair runners for stairs  Foam tubing does work for writing implements - hold with fist  Asked for a external keyboard for ipad  Prop luke on wedge pillow, luke propped, going well to just tap page and with large print  Using  gloves for driving  Got new glasses for drinking, they have an indent, narrow.  Yeti handle potentially  [email or web address]    Joint Protection:  Has changed how she holds her phone, using dictation now (mainly with friends)    Exercises / stabilization:   finger stability exercises - modified - for IF, MF, RF only, press into yellow foam, on leg (table is too high)    Modifications   Alternating bath w shower  using fist  on pen with foam tubing  Possible roll under FA for writing   Trying to take more breaks ie doing dishes  trying to do some more dictation    Activities:  May try some pilates  Took photos of yoga box cards, qi gong  Knitting potentially  Biking potentially - [place] as a resource  yeti handle  friction tape for an underlayer for foam tubing, maybe over it, for building up a handle    Next Visit:  Exercises, Look further into modifications,Activity mods, recreation  Possible wrist orthoses?

## 2022-02-03 NOTE — TELEPHONE ENCOUNTER
Called and left patient a voicemail to see where and when she had her septoplasty. Left my direct number for call back [phone] (ok to leave detailed voicemail). If it was done in the past 5 years, will need to know how to get an ABIGAIL to her (email vs. Mail).    Am   Clinic

## 2022-02-08 ENCOUNTER — THERAPY VISIT (OUTPATIENT)
Dept: OCCUPATIONAL THERAPY | Facility: CLINIC | Age: 32
End: 2022-02-08
Payer: COMMERCIAL

## 2022-02-08 DIAGNOSIS — M25.531 PAIN IN BOTH WRISTS: Primary | ICD-10-CM

## 2022-02-08 DIAGNOSIS — M25.532 PAIN IN BOTH WRISTS: Primary | ICD-10-CM

## 2022-02-08 PROCEDURE — 97535 SELF CARE MNGMENT TRAINING: CPT | Mod: GO | Performed by: OCCUPATIONAL THERAPIST

## 2022-02-08 PROCEDURE — 97110 THERAPEUTIC EXERCISES: CPT | Mod: GO | Performed by: OCCUPATIONAL THERAPIST

## 2022-02-08 NOTE — PROGRESS NOTES
Discharge Note - Hand Therapy    Current Date:  2/9/2022    Initial Evaluation Date:  11/26/22  Reporting period is from 1/26/22 to 2/9/2022  Number of Visits:  8    Diagnosis: Bilateral UE weakness, recent dx hEDS  Recent EDS Diagnosis  Orders 11/3/21    Precautions: EDS, MCAS, does not have POTS per tilt table test today.    Subjective:  Doing better, new strategies. Fingers are less sore since she has her nails done and is being more careful about the hands. Started some strategies for work to reduce writing.    Objective:    Pain Level (Scale 0-10)   11/26/2021 1/26/2022      At Rest 3 today mild    At worst 10 10      Pain Description  Date 11/26/2021 1/26/2022     Location wrist, hand and digits B radial wrists. Elbow has been a bit better. Fingers - managing well   Pain Quality Dull, Tender, Throbbing and Tiring, shooting with hands - kwaku with writing. Especially R wrist/FA/hand    Frequency constant      Pain is worst  daytime or nighttime    Exacerbated by  overuse    Relieved by cold, heat and biofreeze, arnica    Progression stable      Beighton Hypermobility Scale:9/9 per MD notes    Edema:  none of the  wrist, hand and thumb    Sensation   WNL throughout all nerve distributions; per patient report and some paresthesias at times    ROM:  NOTE: Wrist ROM is WNL all planes. Noted below is pain with end range  Wrist 11/26/2021   AROM (PROM)    Extension R:+  L:+   Flexion R:+  L:+   RD R:+  L:+   UD R:+  L:+   Supination R:+  L:+   Pronation R:  L:+ gets some spasms distal dorsal hand     ROM: Fingers: Noted to be  WNL but hypermobile    Hyperextension noted per joint:  Present = +/ Absent = -  11/26/2021  Hyperextension PIP DIP   Index R:+  L:+ R:+  L:+   MF R: +  L:+ R:+  L:+   RF R:+  L:+ R:+  L:+   Sf R:+  L:+ R:+  L:+   Thumb IP R:+  L:+     Thumb MP R:+  L:+       ROM  Thumb 11/26/2021 11/26/2021   AROM  (PROM) R L   MP hypermobile hypermobile   IP hypermobile hypermobile   RABD hypermobile  hypermobile   PABD hypermobile hypermobile   Retropulsion     Kapandji Opposition Scale (0-10/10) Beyond 10 Beyond 10     Other: Does have hypermobile ankles, hips    Strength   (Measured in pounds, one trial only)  Pain Report: - none  + mild    ++ moderate    +++ severe    11/26/2021    R:79  L:63     Writing: going better, trying to modify, dictate    Assessment:  Response to therapy has been improvement to:  self management of symptoms and pain management.    Appropriateness of Rx I have re-evaluated this patient and find that the nature, scope, duration and intensity of the therapy is appropriate for the medical condition of the patient.  Overall Assessment:  Patient is progressing well.  Patient is ready to be discharged from therapy and continue their home treatment program.  STG/LTG:  See goal sheet for details and updates.    Plan:  Frequency/Duration:  Discharge from Hand Therapy; continue home program.      Home Program:    Difficult activities noted:  Pt needs to do writing during consults, uses laptop, ipad.  Needs to hand write for work around 2 hours at a time, puts hand notes into Drive (scans)  Also needs to text, email clients alot    Patient goals:  General strengthening, writing has always been an issue  Would like to have an active hobby - loves tennis, waterskiing    Pain management  Uses heat, ice, biofreeze, arnica, got some voltaren  KT 1st dorsal comp- does not stay on (needs it tacked below the MPj)  Massages once a week  chiro once a week    Orthoses:  Sleeps with soft splints (wrist/thumb)  Daily preventative splints:  Silver rings  Oval 8s - for all PIPj, and thumb IPs. B IF, MF connected via tape  For times of more pain:  B custom hand based thumb support orthoses - thumb IPj free  B IF, MF gutter orthosis to wear at night, under gloves (PIP/DIP included)    Gloves/sleeve/straps/compression:  imak gloves size M - most of the day (help with writing)  Talked about compression sleeve  for elbow (R) O&P can fit, or order online.  Parag straps help    Adaptive aides:  Uses a pen again - still uses it with a wrap   Keypad for ipad (made a giant spreadsheet/grid format for less typing, email  Templates for various parts of the work preocess  SI belt  Stair runners for stairs  Foam tubing does work for writing implements - hold with fist  Asked for a external keyboard for ipad  Prop luke on wedge pillow, luke propped, going well to just tap page and with large print  Using  gloves for driving  Got new glasses for drinking, they have an indent, narrow.  Yeti handle potentially  [email or web address]    Joint Protection:  Has changed how she holds her phone, using dictation now (mainly with friends)    Exercises / stabilization:   finger stability exercises - modified - for IF, MF, RF only, press into yellow foam, on leg (table is too high)  Gripping with custom made tennis ball shaped gripper - isometric    Modifications   Alternating bath w shower  using fist  on pen with foam tubing  Possible roll under FA for writing   Trying to take more breaks ie doing dishes  trying to do some more dictation    Activities:  WORK: has come up with modifications, spread sheet, checklist etc to decrease amount of typing.    May try some pilates  May try tennis or golf with built up handles  Took photos of yoga box cards, qi gong  Knitting potentially  Biking potentially - [place] as a resource  yeti handle  friction tape for an underlayer for foam tubing, maybe over it, for building up a handle  Swimming - classes at [employer]

## 2022-02-09 ENCOUNTER — HOSPITAL ENCOUNTER (OUTPATIENT)
Dept: PHYSICAL THERAPY | Facility: CLINIC | Age: 32
Setting detail: THERAPIES SERIES
End: 2022-02-09
Attending: FAMILY MEDICINE
Payer: COMMERCIAL

## 2022-02-09 PROBLEM — M25.532 PAIN IN BOTH WRISTS: Status: RESOLVED | Noted: 2021-11-26 | Resolved: 2022-02-09

## 2022-02-09 PROBLEM — M25.531 PAIN IN BOTH WRISTS: Status: RESOLVED | Noted: 2021-11-26 | Resolved: 2022-02-09

## 2022-02-09 PROCEDURE — 97112 NEUROMUSCULAR REEDUCATION: CPT | Mod: GP | Performed by: PHYSICAL THERAPIST

## 2022-02-09 PROCEDURE — 97110 THERAPEUTIC EXERCISES: CPT | Mod: GP | Performed by: PHYSICAL THERAPIST

## 2022-02-11 NOTE — PROGRESS NOTES
"   02/09/22 0900   Cervicogenic Screen   Neck ROM Hypermobility noted in all ROM    Vertebral Artery Test Normal   Vertebral Artery Test Comments No symptoms, able to hold conversation entire duration of hold    Alar Ligament Test Abnormal   Alar Ligament Test Comments patient reports nausea provoking, less instability than TLT but sxs present    Transverse Ligament Test Abnormal   Transverse Ligament Test Comments Notes: Increased mobility: patien complaints of pain+light headedness, nauseaa inducing, very unstable feeling    Neck Torsion Test (head still, body rotating) Abnormal   Neck Torsion Test (head still, body rotating) Comments reproduces sxs of lightheadedness, body doesn't feel as grounded; target gets more blurry    Neck Torsion Test (head and body rotating) Negative   Oculomotor Exam   Smooth Pursuit Abnormal   Saccades Other   Saccades Comments very slow speed, unable to go faster    VOR Abnormal   VOR Comments reports \"ungrounding feeling\" vertical worse than horizontal, mild saccadic movement with vertical    Convergence Testing Abnormal   Convergence Testing Comments 23cm     "

## 2022-02-17 NOTE — TELEPHONE ENCOUNTER
Left patient a 2nd voicemail, needing to know when and where she had her nasal septoplasty and if she has any outside records. Left my direct number for call back, [phone] (ok to [facility]). Will try to reach patient a 3rd time before notifying team about potential ABIGAIL - Amay

## 2022-02-19 ENCOUNTER — OFFICE VISIT (OUTPATIENT)
Dept: INTERNAL MEDICINE | Facility: CLINIC | Age: 32
End: 2022-02-19
Payer: COMMERCIAL

## 2022-02-19 VITALS
OXYGEN SATURATION: 98 % | HEART RATE: 81 BPM | BODY MASS INDEX: 22.14 KG/M2 | SYSTOLIC BLOOD PRESSURE: 121 MMHG | WEIGHT: 129 LBS | DIASTOLIC BLOOD PRESSURE: 80 MMHG

## 2022-02-19 DIAGNOSIS — Q79.62 HYPERMOBILE EHLERS-DANLOS SYNDROME: Primary | ICD-10-CM

## 2022-02-19 DIAGNOSIS — R51.9 CHRONIC NONINTRACTABLE HEADACHE, UNSPECIFIED HEADACHE TYPE: ICD-10-CM

## 2022-02-19 DIAGNOSIS — N94.19 DYSPAREUNIA DUE TO MEDICAL CONDITION IN FEMALE: ICD-10-CM

## 2022-02-19 DIAGNOSIS — N39.3 FEMALE STRESS INCONTINENCE: ICD-10-CM

## 2022-02-19 DIAGNOSIS — G89.29 CHRONIC NONINTRACTABLE HEADACHE, UNSPECIFIED HEADACHE TYPE: ICD-10-CM

## 2022-02-19 PROBLEM — N94.10 PAIN IN FEMALE GENITALIA ON INTERCOURSE: Status: ACTIVE | Noted: 2022-02-19

## 2022-02-19 PROBLEM — M79.10 MUSCLE PAIN: Status: ACTIVE | Noted: 2022-02-19

## 2022-02-19 PROBLEM — F41.1 GENERALIZED ANXIETY DISORDER: Status: ACTIVE | Noted: 2022-01-13

## 2022-02-19 PROBLEM — K59.09 CHRONIC CONSTIPATION: Status: ACTIVE | Noted: 2022-01-13

## 2022-02-19 PROBLEM — L70.9 ACNE: Status: ACTIVE | Noted: 2017-03-01

## 2022-02-19 PROBLEM — G56.00 CARPAL TUNNEL SYNDROME: Status: ACTIVE | Noted: 2022-01-13

## 2022-02-19 PROBLEM — F43.23 ADJUSTMENT DISORDER WITH MIXED ANXIETY AND DEPRESSED MOOD: Status: ACTIVE | Noted: 2017-04-20

## 2022-02-19 PROBLEM — R53.83 FATIGUE: Status: ACTIVE | Noted: 2022-01-13

## 2022-02-19 PROCEDURE — 99205 OFFICE O/P NEW HI 60 MIN: CPT | Performed by: PEDIATRICS

## 2022-02-19 PROCEDURE — 99417 PROLNG OP E/M EACH 15 MIN: CPT | Performed by: PEDIATRICS

## 2022-02-19 RX ORDER — RIZATRIPTAN BENZOATE 10 MG/1
TABLET ORAL
COMMUNITY
Start: 2022-02-11 | End: 2022-02-25

## 2022-02-19 RX ORDER — DOXEPIN HYDROCHLORIDE 10 MG/1
CAPSULE ORAL
COMMUNITY
Start: 2022-02-11 | End: 2022-02-25

## 2022-02-19 RX ORDER — IBUPROFEN 800 MG/1
800 TABLET, FILM COATED ORAL
COMMUNITY
Start: 2021-01-09 | End: 2022-06-23

## 2022-02-19 RX ORDER — BUPROPION HYDROCHLORIDE 75 MG/1
TABLET ORAL
COMMUNITY
Start: 2022-02-10

## 2022-02-19 RX ORDER — HYDROXYZINE HYDROCHLORIDE 10 MG/1
TABLET, FILM COATED ORAL
COMMUNITY
Start: 2021-12-10

## 2022-02-19 RX ORDER — BUPROPION HYDROCHLORIDE 150 MG/1
150 TABLET ORAL DAILY
COMMUNITY
Start: 2022-02-10

## 2022-02-19 RX ORDER — HYDROXYZINE HYDROCHLORIDE 25 MG/1
TABLET, FILM COATED ORAL
COMMUNITY
Start: 2021-03-31 | End: 2022-07-13

## 2022-02-19 RX ORDER — MAGNESIUM GLUCONATE 27 MG(500)
500 TABLET ORAL
COMMUNITY
End: 2022-02-25

## 2022-02-19 RX ORDER — CALCIUM CARBONATE 500(1250)
TABLET ORAL
COMMUNITY
End: 2022-02-25

## 2022-02-19 ASSESSMENT — PAIN SCALES - GENERAL: PAINLEVEL: MODERATE PAIN (4)

## 2022-02-19 NOTE — ASSESSMENT & PLAN NOTE
Pelvic/vaginal pain on intercourse  SUBJECTIVE: This is her main problem at present.  She cannot tolerate sexual intercourse, despite efforts at vaginal lubrication and reduce constipation (the latter was the medication because of some posterior bulging thought due to rectal stool burden).  She was already referred to GYN Dr. Marquez, and is on a waiting list with an April appointment.  She is concerned that she has developed organ prolapse following a spontaneous vaginal birth in January 2021.       OBJECTIVE: Nonspecific mild abdominal pain suprapubic and left lower quadrant with no substantial stool mass on left.  Right lower quadrant has moderate or worse tenderness and nonspecific fullness possibly consistent with gas.       ASSESSMENT & PLAN: We talked over possibilities and options, and based on that I elected not to do an exam today.  If she has trouble getting in with Dr. Marquez on a timely basis, then I said I would put in a referral for one of my own GYN colleagues.  I also think that she should see uro-/GYN, given her stress urinary, see below.

## 2022-02-19 NOTE — ASSESSMENT & PLAN NOTE
UPDATE: She has had small amounts of urine production with sneezing, coughing, or laughing.  She relates this to the time of her vaginal birth January 2021.  She suspects that she will need pelvic floor therapy.       ASSESSMENT & PLAN: Her Beighton exam seems somewhat closer to classic EDS then hEDS, although I cannot easily distinguish without seeing an atrophic scar.  With her hypermobility, I am not surprised that she is having more pelvic floor problems despite her only modest constipation and joint history.    Plan for referral to Uro/gyn colleagues, trying for Dr. Forrester.    Also note that she is working to get in to see GYN Dr. Marquez regarding dyspareunia possibly due to prolapse by history.  Transvaginal ultrasound was ordered.

## 2022-02-19 NOTE — ASSESSMENT & PLAN NOTE
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  OBJECTIVE:   A Beighton exam was performed, based on published recommendations. The findings:   - Passive apposition of the thumbs to the flexor aspect of the forearm:Both sides (2 point)   - Passive dorsiflexion of the little fingers beyond 90 degrees: Both sides (2 point)   - Hyperextension of the elbows beyond 10 degrees: Both sides (2 point)   - Hyperextension of the knees beyond 10 degrees: Both sides (2 point)   - Forward flexion of the trunk with knees fully extended so that the palms of the hand rest flat on the floor: Present (1 point)  Total points: 9/9    The following additional signs of hypermobility were noted:   - unusually soft or velvety skin   - mild skin hyperextensibility at hand and jaw   - unexplained striae or rubrae (without a history of weight gain) reported inner thighs   - bilateral piezogenic papules of the heel   - arachnodactyly, as defined by...       ... positive wrist sign (Walker sign) on both sides       ... positive thumb sign (Fanny sign) on both sides  mouth not examined today    I also observed:   - excessive lateral flexion of the neck (especially to right)   - hitchhiker thumb on both sides   - handshake behind back, with high on both sides    ASSESSMENT & PLAN:   Ms. 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 Explained about this condition, what it is, and what to do about it. We discussed this in detail, and also how the official designation of EDS is no longer particularly important. Some of this information is provided in the patient instruction section of this encounter. The keys for HMS are to (a) work on muscle strength around problem joints, (b) protect problem joints in unusual circumstances of strain or effort, (c) be aware of  additional conditions that may develop such as autonomic dysfunction, and (d) ensure that additional conditions do not become worse because of inactivity or other pitfalls.       ADDENDUM: at child's appt with her , Yoly asked me to talk with him regarding my thoughts about pregnancy. Extensive conversation, with highlights:       -- Yes, the relaxin effect will be more pronounced in women with EDS       -- I don't endorse Internet/folklore rumors that women with EDS should not become pregnant. There can be problems, but they are similar to what might have developed anyway. I have taken care of many women with EDS who were pregnant or recently pregnant.       -- Much of Yoly's morbidity from pregnancy and hypermobility may have already occurred with her [id number] history. She may have some modest additional effects with G3, but treatment will be needed regardless. Dr. Marquez may even want her to visit at least once with high-risk MFM.       -- Yoly is an ideal woman for coping with hypermobility during pregnancy.  Indeed, she demonstrated this with her first pregnancy, including early use of adaptive equipment and techniques.

## 2022-02-19 NOTE — PROGRESS NOTES
"Dear patient. Thank you for visiting with me. I want you to feel respected, understood, and empowered. \"Respect\" is valuing you as much as I would a close family member. \"Empowerment\" happens when you are fully informed, and can make the best possible decision for you.  Please ask me questions!  Challenge anything that is not clear.    Medical records are primarily used as memory aids for me and my colleagues. Things to know about my documentation style:  - The 'problem list' includes current symptoms or diagnoses, and some problems that are resolved but may return. I use the past medical history for problems not expected to return.  - I use single quotation marks for things that you or I said, when I want to clarify who was speaking.  - I use double quotation marks when copying a term from elsewhere in your records. Italics (besides here) may also denote a quotation.  If you have questions or concerns, please contact me; I will reply as soon as time allows.    Context    Yoly Adams is a 31 year old woman, with concerns including:  Chief Complaint   Patient presents with     Establish Care     pt here to establish care, discuss EDS diagnosis, pelvic pain since giving birth     PCP: Murray Tang MD, will be seeing Zainab Melendrez as consultant  Visit type: overview of problems, with several issues identified by patient discussed within limits of available time    /80 (BP Location: Right arm, Patient Position: Sitting, Cuff Size: Adult Regular)   Pulse 81   Wt 58.5 kg (129 lb)   SpO2 98%   BMI 22.14 kg/m      Problems and progress      Problem List as of 2/19/2022 Reviewed: 2/19/2022  4:18 PM by Murray Tang MD          Noted       Nervous and Auditory    1. Dyspareunia due to medical condition in female 2/19/2022     Last Assessment & Plan 2/19/2022 Office Visit Edited 2/19/2022  4:13 PM by Murray Tang MD      Pelvic/vaginal pain on intercourse  SUBJECTIVE: This is her " main problem at present.  She cannot tolerate sexual intercourse, despite efforts at vaginal lubrication and reduce constipation (the latter was the medication because of some posterior bulging thought due to rectal stool burden).  She was already referred to GYN Dr. Marquez, and is on a waiting list with an April appointment.  She is concerned that she has developed organ prolapse following a spontaneous vaginal birth in January 2021.       OBJECTIVE: Nonspecific mild abdominal pain suprapubic and left lower quadrant with no substantial stool mass on left.  Right lower quadrant has moderate or worse tenderness and nonspecific fullness possibly consistent with gas.       ASSESSMENT & PLAN: We talked over possibilities and options, and based on that I elected not to do an exam today.  If she has trouble getting in with Dr. Marquez on a timely basis, then I said I would put in a referral for one of my own GYN colleagues.  I also think that she should see uro-/GYN, given her stress urinary, see below.                 Relevant Orders     Adult Urology Referral     US Pelvic w/ Transvaginal    2. Chronic nonintractable headache 2/19/2022     Last Assessment & Plan 2/19/2022 Office Visit Written 2/19/2022  4:14 PM by Murray Tang MD      Only briefly discussed today.  She is having evaluation for craniocervical instability          Relevant Medications     buPROPion (WELLBUTRIN) 75 MG tablet     buPROPion (WELLBUTRIN XL) 150 MG 24 hr tablet     doxepin (SINEQUAN) 10 MG capsule     ibuprofen (ADVIL/MOTRIN) 800 MG tablet     rizatriptan (MAXALT) 10 MG tablet     sertraline (ZOLOFT) 50 MG tablet       Endocrine    3. Hypermobile Balta-Danlos syndrome - Primary 2/19/2022     Overview Signed 2/19/2022  3:57 PM by Murray Tang MD      See Dr. 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  OBJECTIVE:   A Beighton exam was performed, based on published recommendations. The findings:   - Passive apposition of the thumbs to the flexor aspect of the forearm:Both sides (2 point)   - Passive dorsiflexion of the little fingers beyond 90 degrees: Both sides (2 point)   - Hyperextension of the elbows beyond 10 degrees: Both sides (2 point)   - Hyperextension of the knees beyond 10 degrees: Both sides (2 point)   - Forward flexion of the trunk with knees fully extended so that the palms of the hand rest flat on the floor: Present (1 point)  Total points: 9/9    The following additional signs of hypermobility were noted:   - unusually soft or velvety skin   - mild skin hyperextensibility at hand and jaw   - unexplained striae or rubrae (without a history of weight gain) reported inner thighs   - bilateral piezogenic papules of the heel   - arachnodactyly, as defined by...       ... positive wrist sign (Walker sign) on both sides       ... positive thumb sign (Fanny sign) on both sides  mouth not examined today    I also observed:   - excessive lateral flexion of the neck (especially to right)   - hitchhiker thumb on both sides   - handshake behind back, with high on both sides    ASSESSMENT & PLAN:   Ms. 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 Explained about this condition, what it is, and what to do about it. We discussed this in detail, and also how the official designation of EDS is no longer particularly important. Some of this information is provided in the patient instruction section of this encounter. The keys for HMS are to (a) work on muscle strength around problem joints, (b) protect problem joints in unusual circumstances of strain or effort, (c) be aware of additional conditions  that may develop such as autonomic dysfunction, and (d) ensure that additional conditions do not become worse because of inactivity or other pitfalls.       ADDENDUM: at child's appt with her , Yoly asked me to talk with him regarding my thoughts about pregnancy. Extensive conversation, with highlights:       -- Yes, the relaxin effect will be more pronounced in women with EDS       -- I don't endorse Internet/folklore rumors that women with EDS should not become pregnant. There can be problems, but they are similar to what might have developed anyway. I have taken care of many women with EDS who were pregnant or recently pregnant.       -- Much of Yoly's morbidity from pregnancy and hypermobility may have already occurred with her [id number] history. She may have some modest additional effects with G3, but treatment will be needed regardless. Dr. Marquez may even want her to visit at least once with high-risk MFM.       -- Yoly is an ideal woman for coping with hypermobility during pregnancy.  Indeed, she demonstrated this with her first pregnancy, including early use of adaptive equipment and techniques.               Relevant Medications     ibuprofen (ADVIL/MOTRIN) 800 MG tablet     Fexofenadine HCl (ALLEGRA ALLERGY PO)       Urinary    4. Female stress incontinence 2022     Last Assessment & Plan 2022 Office Visit Written 2022  4:12 PM by Murray Tang MD           UPDATE: She has had small amounts of urine production with sneezing, coughing, or laughing.  She relates this to the time of her vaginal birth 2021.  She suspects that she will need pelvic floor therapy.       ASSESSMENT & PLAN: Her Beighton exam seems somewhat closer to classic EDS then hEDS, although I cannot easily distinguish without seeing an atrophic scar.  With her hypermobility, I am not surprised that she is having more pelvic floor problems despite her only modest constipation and joint  history.    Plan for referral to Uro/gyn colleagues, trying for Dr. Forrester.    Also note that she is working to get in to see GYN Dr. Marquez regarding dyspareunia possibly due to prolapse by history.  Transvaginal ultrasound was ordered.             Relevant Orders     Adult Urology Referral     US Pelvic w/ Transvaginal          Additional issues:    Child is 13 months old, she is sparingly nursing BID, few minutes at a time    She has had COVID immunizations but not Covid itself        Other comments    Other physical exam  Physical Exam  Constitutional:       General: She is not in acute distress.     Appearance: Normal appearance. She is not ill-appearing.   HENT:      Head: Normocephalic.      Nose: Nose normal.   Eyes:      General: No scleral icterus.        Right eye: No discharge.         Left eye: No discharge.      Extraocular Movements: Extraocular movements intact.      Pupils: Pupils are equal, round, and reactive to light.   Pulmonary:      Effort: Pulmonary effort is normal. No respiratory distress.   Neurological:      Mental Status: She is alert.   Psychiatric:         Mood and Affect: Mood normal.         Behavior: Behavior normal.            About this visit:  Time note (([id number]+29965, '): The total time (on the date of service) for this service was 100 minutes, including discussion/face-to-face, chart review, interpretation not otherwise reported, documentation, and updating of the computerized record.

## 2022-02-21 ENCOUNTER — MYC MEDICAL ADVICE (OUTPATIENT)
Dept: INTERNAL MEDICINE | Facility: CLINIC | Age: 32
End: 2022-02-21

## 2022-02-21 ENCOUNTER — ANCILLARY PROCEDURE (OUTPATIENT)
Dept: ULTRASOUND IMAGING | Facility: CLINIC | Age: 32
End: 2022-02-21
Attending: PEDIATRICS
Payer: COMMERCIAL

## 2022-02-21 DIAGNOSIS — N94.19 DYSPAREUNIA DUE TO MEDICAL CONDITION IN FEMALE: ICD-10-CM

## 2022-02-21 DIAGNOSIS — N39.3 FEMALE STRESS INCONTINENCE: ICD-10-CM

## 2022-02-21 DIAGNOSIS — D89.40 MAST CELL ACTIVATION SYNDROME (H): ICD-10-CM

## 2022-02-21 PROCEDURE — 76830 TRANSVAGINAL US NON-OB: CPT | Mod: GC | Performed by: RADIOLOGY

## 2022-02-21 PROCEDURE — 76856 US EXAM PELVIC COMPLETE: CPT | Mod: GC | Performed by: RADIOLOGY

## 2022-02-22 PROBLEM — D89.40 MAST CELL ACTIVATION SYNDROME (H): Status: ACTIVE | Noted: 2022-02-22

## 2022-02-23 ENCOUNTER — HOSPITAL ENCOUNTER (OUTPATIENT)
Dept: PHYSICAL THERAPY | Facility: CLINIC | Age: 32
Setting detail: THERAPIES SERIES
End: 2022-02-23
Attending: FAMILY MEDICINE
Payer: COMMERCIAL

## 2022-02-23 PROCEDURE — 97110 THERAPEUTIC EXERCISES: CPT | Mod: GP | Performed by: PHYSICAL THERAPIST

## 2022-02-23 PROCEDURE — 97112 NEUROMUSCULAR REEDUCATION: CPT | Mod: GP | Performed by: PHYSICAL THERAPIST

## 2022-02-23 NOTE — CONFIDENTIAL NOTE
Hello coordinators. Can you please contact patient and have her scheduled with me OR any of the following: Rupesh, Philippe Mcleod, Nikhil Hennessy Allen, or Polo.    Ideally appointment will be next 2 weeks, or before mid-March.

## 2022-02-25 ENCOUNTER — OFFICE VISIT (OUTPATIENT)
Dept: FAMILY MEDICINE | Facility: CLINIC | Age: 32
End: 2022-02-25
Payer: COMMERCIAL

## 2022-02-25 VITALS
WEIGHT: 121.8 LBS | HEIGHT: 64 IN | OXYGEN SATURATION: 100 % | TEMPERATURE: 98 F | BODY MASS INDEX: 20.79 KG/M2 | RESPIRATION RATE: 16 BRPM | DIASTOLIC BLOOD PRESSURE: 78 MMHG | SYSTOLIC BLOOD PRESSURE: 119 MMHG | HEART RATE: 81 BPM

## 2022-02-25 DIAGNOSIS — R10.2 PELVIC PAIN IN FEMALE: ICD-10-CM

## 2022-02-25 DIAGNOSIS — N91.1 AMENORRHEA, SECONDARY: ICD-10-CM

## 2022-02-25 DIAGNOSIS — Z00.00 HEALTH CARE MAINTENANCE: ICD-10-CM

## 2022-02-25 DIAGNOSIS — J30.9 ALLERGIC RHINITIS, UNSPECIFIED SEASONALITY, UNSPECIFIED TRIGGER: Primary | ICD-10-CM

## 2022-02-25 PROCEDURE — 99417 PROLNG OP E/M EACH 15 MIN: CPT | Performed by: FAMILY MEDICINE

## 2022-02-25 PROCEDURE — 87624 HPV HI-RISK TYP POOLED RSLT: CPT | Mod: 90 | Performed by: PATHOLOGY

## 2022-02-25 PROCEDURE — G0145 SCR C/V CYTO,THINLAYER,RESCR: HCPCS | Performed by: FAMILY MEDICINE

## 2022-02-25 PROCEDURE — 99000 SPECIMEN HANDLING OFFICE-LAB: CPT | Performed by: PATHOLOGY

## 2022-02-25 PROCEDURE — 99215 OFFICE O/P EST HI 40 MIN: CPT | Performed by: FAMILY MEDICINE

## 2022-02-25 RX ORDER — ACETAMINOPHEN 325 MG/1
2 TABLET ORAL PRN
COMMUNITY

## 2022-02-25 RX ORDER — TRIAMCINOLONE ACETONIDE 55 UG/1
2 SPRAY, METERED NASAL DAILY
Start: 2022-02-25

## 2022-02-25 ASSESSMENT — PAIN SCALES - GENERAL: PAINLEVEL: NO PAIN (0)
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
## 2022-02-25 NOTE — NURSING NOTE
Chief Complaint   Patient presents with     RECHECK     Patient comes in for a follow up.         Yuri Garrison MA on 2/25/2022 at 8:36 AM

## 2022-03-01 LAB
BKR LAB AP GYN ADEQUACY: NORMAL
BKR LAB AP GYN INTERPRETATION: NORMAL
BKR LAB AP HPV REFLEX: NORMAL
BKR LAB AP PREVIOUS ABNORMAL: NORMAL
PATH REPORT.COMMENTS IMP SPEC: NORMAL
PATH REPORT.COMMENTS IMP SPEC: NORMAL
PATH REPORT.RELEVANT HX SPEC: NORMAL

## 2022-03-03 LAB
HUMAN PAPILLOMA VIRUS 16 DNA: NEGATIVE
HUMAN PAPILLOMA VIRUS 18 DNA: NEGATIVE
HUMAN PAPILLOMA VIRUS FINAL DIAGNOSIS: NORMAL
HUMAN PAPILLOMA VIRUS OTHER HR: NEGATIVE

## 2022-03-15 ENCOUNTER — TRANSCRIBE ORDERS (OUTPATIENT)
Dept: LAB | Facility: CLINIC | Age: 32
End: 2022-03-15

## 2022-03-15 ENCOUNTER — OFFICE VISIT (OUTPATIENT)
Dept: OTOLARYNGOLOGY | Facility: CLINIC | Age: 32
End: 2022-03-15
Payer: COMMERCIAL

## 2022-03-15 ENCOUNTER — PRE VISIT (OUTPATIENT)
Dept: OTOLARYNGOLOGY | Facility: CLINIC | Age: 32
End: 2022-03-15

## 2022-03-15 VITALS
DIASTOLIC BLOOD PRESSURE: 84 MMHG | WEIGHT: 128 LBS | OXYGEN SATURATION: 99 % | HEART RATE: 82 BPM | HEIGHT: 64 IN | SYSTOLIC BLOOD PRESSURE: 125 MMHG | BODY MASS INDEX: 21.85 KG/M2

## 2022-03-15 DIAGNOSIS — H69.93 DYSFUNCTION OF BOTH EUSTACHIAN TUBES: Primary | ICD-10-CM

## 2022-03-15 DIAGNOSIS — R53.83 FATIGUE: ICD-10-CM

## 2022-03-15 DIAGNOSIS — L50.3 DERMATOGRAPHIC URTICARIA: ICD-10-CM

## 2022-03-15 DIAGNOSIS — R58 HEMORRHAGE OF BLOOD VESSEL: ICD-10-CM

## 2022-03-15 DIAGNOSIS — D89.40 MAST CELL ACTIVATION (H): Primary | ICD-10-CM

## 2022-03-15 PROCEDURE — 99203 OFFICE O/P NEW LOW 30 MIN: CPT | Performed by: OTOLARYNGOLOGY

## 2022-03-15 RX ORDER — OLOPATADINE HYDROCHLORIDE 2 MG/ML
SOLUTION/ DROPS OPHTHALMIC
COMMUNITY
Start: 2022-03-13 | End: 2022-06-23

## 2022-03-15 RX ORDER — CALCIUM CARBONATE 500(1250)
TABLET ORAL
COMMUNITY
Start: 2022-03-13

## 2022-03-15 RX ORDER — MONTELUKAST SODIUM 10 MG/1
TABLET ORAL
COMMUNITY
Start: 2022-03-13

## 2022-03-15 RX ORDER — ASCORBIC ACID 250 MG
TABLET,CHEWABLE ORAL
COMMUNITY
Start: 2022-03-12 | End: 2022-06-23

## 2022-03-15 RX ORDER — BUDESONIDE 3 MG/1
CAPSULE, COATED PELLETS ORAL
COMMUNITY
Start: 2022-03-13

## 2022-03-15 RX ORDER — DOXEPIN HYDROCHLORIDE 10 MG/1
CAPSULE ORAL
COMMUNITY
Start: 2022-03-13 | End: 2022-06-01

## 2022-03-15 RX ORDER — RIZATRIPTAN BENZOATE 10 MG/1
TABLET ORAL
COMMUNITY
Start: 2022-03-13

## 2022-03-15 RX ORDER — METHYLPHENIDATE HYDROCHLORIDE 10 MG/1
10 TABLET ORAL 2 TIMES DAILY
COMMUNITY
Start: 2022-03-02

## 2022-03-15 RX ORDER — OMEGA-3/DHA/EPA/FISH OIL 60 MG-90MG
CAPSULE ORAL
COMMUNITY
Start: 2022-03-02

## 2022-03-15 RX ORDER — FAMOTIDINE 20 MG/1
20 TABLET, FILM COATED ORAL 2 TIMES DAILY
COMMUNITY
Start: 2022-03-13

## 2022-03-15 RX ORDER — VITAMIN B COMPLEX
TABLET ORAL
COMMUNITY
Start: 2022-03-13

## 2022-03-15 RX ORDER — CALCIUM POLYCARBOPHIL 625 MG 625 MG/1
TABLET ORAL
COMMUNITY
Start: 2022-03-02

## 2022-03-15 RX ORDER — MULTIVITAMIN WITH IRON
TABLET ORAL
Status: ON HOLD | COMMUNITY
Start: 2022-03-13 | End: 2023-11-17

## 2022-03-15 RX ORDER — CROMOLYN SODIUM 100 MG/5ML
SOLUTION, CONCENTRATE ORAL
COMMUNITY
Start: 2022-03-13

## 2022-03-15 ASSESSMENT — PAIN SCALES - GENERAL: PAINLEVEL: NO PAIN (0)

## 2022-03-15 NOTE — PATIENT INSTRUCTIONS
1. Please follow-up in clinic in 6 months   2. Please call the ENT clinic with any questions,concerns, new or worsening symptoms.    -Clinic number is [phone]   - Sandra's direct line (Dr. Schmidt's nurse) [phone]
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
## 2022-03-15 NOTE — NURSING NOTE
"Chief Complaint   Patient presents with     RECHECK     septorhino plasty       Blood pressure 125/84, pulse 82, height 1.626 m (5' 4\"), weight 58.1 kg (128 lb), SpO2 99 %.    Prema Salamanca, EMT    "

## 2022-03-23 ENCOUNTER — PRE VISIT (OUTPATIENT)
Dept: UROLOGY | Facility: CLINIC | Age: 32
End: 2022-03-23
Payer: COMMERCIAL

## 2022-03-23 NOTE — TELEPHONE ENCOUNTER
MEDICAL RECORDS REQUEST   [facility]  Urology Clinic  [address] [place] [address]  PHONE: [phone]  Fax: [phone]        FUTURE VISIT INFORMATION                                                   Yoly Adams, : 1990 scheduled for future visit at [facility]    APPOINTMENT INFORMATION:    Date: 2022    Provider:  Adela Forrester MD    Reason for Visit/Diagnosis: Dyspareunia due to medical condition in female [N94.19], Female stress incontinence [N39.3]    REFERRAL INFORMATION:    Referring provider:  Murray Tang MD    Referring providers clinic:  [facility]    RECORDS REQUESTED FOR VISIT                                                     NOTES  STATUS/DETAILS   OFFICE NOTE from referring provider  yes, 2022 -- Murray Tang MD in [facility]   MEDICATION LIST  yes   IMAGING (IMAGES & REPORT)  yes, 2022 -- US PELVIC     PRE-VISIT CHECKLIST      Record collection complete Yes   Appointment appropriately scheduled           (right time/right provider) Yes   Joint diagnostic appointment coordinated correctly          (ensure right order & amount of time) Yes   MyChart activation Yes   Questionnaire complete If no, please explain pending

## 2022-03-28 ENCOUNTER — PRE VISIT (OUTPATIENT)
Dept: UROLOGY | Facility: CLINIC | Age: 32
End: 2022-03-28
Payer: COMMERCIAL

## 2022-03-28 NOTE — TELEPHONE ENCOUNTER
Reason for visit: Pelvic pain/prolapse consult     Relevant information: francesca-danlos syndrome    Records/imaging/labs/orders: all records available    Pt called: no need for a call    At Room[facility]: collect a urine/pvr

## 2022-03-30 ENCOUNTER — HOSPITAL ENCOUNTER (OUTPATIENT)
Dept: PHYSICAL THERAPY | Facility: CLINIC | Age: 32
Setting detail: THERAPIES SERIES
Discharge: HOME | End: 2022-03-30
Attending: FAMILY MEDICINE
Payer: COMMERCIAL

## 2022-03-30 PROCEDURE — 97110 THERAPEUTIC EXERCISES: CPT | Mod: GP | Performed by: PHYSICAL THERAPIST

## 2022-03-30 PROCEDURE — 97530 THERAPEUTIC ACTIVITIES: CPT | Mod: GP | Performed by: PHYSICAL THERAPIST

## 2022-03-30 NOTE — DISCHARGE INSTRUCTIONS
Pelvic floor PTs  Sarah Carty, PT, ATC/R, SCS  [email or web address]  [facility]  [address]  [address]  [place] [address]  Appointments: [phone]  Clinic: [phone]  Fax: [phone]      Yeimi (Tanya) [email or web address]  [address] [address]  Appointments: [phone]  Clinic: [phone]  Fax: [phone]    OR  [facility]  [address]  [address]  [place] [address]  Appointments: [phone]  Clinic: [phone]  Fax: [phone]        Neck  - Try to keep your neck within 20-30* of rotation  - If it causes dizziness STOP IMMEDIATELY and get to neutral  - Bring things up visually so you avoid looking down.    Ask Dr Melendrez/Dr Tang if they feel like the C4-5 needs to be surgically managed since it is abutting the spinal cord in all neck positions

## 2022-04-06 ENCOUNTER — THERAPY VISIT (OUTPATIENT)
Dept: PHYSICAL THERAPY | Facility: CLINIC | Age: 32
End: 2022-04-06
Attending: FAMILY MEDICINE
Payer: COMMERCIAL

## 2022-04-06 DIAGNOSIS — M99.05 SOMATIC DYSFUNCTION OF PELVIC REGION: ICD-10-CM

## 2022-04-06 DIAGNOSIS — R10.2 PELVIC PAIN IN FEMALE: ICD-10-CM

## 2022-04-06 PROCEDURE — 97161 PT EVAL LOW COMPLEX 20 MIN: CPT | Mod: GP | Performed by: PHYSICAL THERAPIST

## 2022-04-06 PROCEDURE — 97112 NEUROMUSCULAR REEDUCATION: CPT | Mod: GP | Performed by: PHYSICAL THERAPIST

## 2022-04-06 PROCEDURE — 97530 THERAPEUTIC ACTIVITIES: CPT | Mod: GP | Performed by: PHYSICAL THERAPIST

## 2022-04-06 PROCEDURE — 97140 MANUAL THERAPY 1/> REGIONS: CPT | Mod: GP | Performed by: PHYSICAL THERAPIST

## 2022-04-06 NOTE — LETTER
[facility]  [address]  [facility] [address]  [phone]    2022    Re: Yoly Adams   :   1990  MRN:  [id number]   REFERRING PHYSICIAN:   MD [facility]  Date of Initial Evaluation:  2022  Visits:  Rxs Used: 1  Reason for Referral:     Pelvic pain in female  Somatic dysfunction of pelvic region    EVALUATION SUMMARY    Physical Therapy Initial Evaluation  Subjective:    Patient Health History  Yoly Adams being seen for Pelvic pain.   Problem began: 2021.   Problem occurred: Childbirth   Pain is reported as 9/10 on pain scale.  General health as reported by patient is good.  Pertinent medical history includes: changes in bowel/bladder, depression, mental illness, migraines/headaches, numbness/tingling, osteoarthritis, pain at night/rest, weakness, unexplained weight loss and other. Other medical history details: Pelvic pain.     Medical allergies: adhesives and other. Other medical allergies details: Botox.   Surgeries include:  Other. Other surgery history details: Oral and eye.    Current medications:  Anti-depressants, anti-inflammatory, high blood pressure medication, pain medication, sleep medication, steroids and other. Other medications details: See list.    Current occupation is Self employed (part time).   Primary job tasks include:  Computer work, driving, lifting/carrying, prolonged sitting, prolonged standing, pushing/pulling and repetitive tasks.                Therapist Generated HPI Evaluation  Problem details: Patient reports since the delivery of her first vaginal delivery 2021.  She had a 2nd degree tear which she healed well after.  She has had to stop having intercourse due to pain 9/10 PL.  Does also have pain with tampon use as well.  Does have some urgency and does need to strain at times to start her flow of urine.  Reports she  "has mixed incontinence about 2-5x/day.  Is leaking overnight and needs to wear a pad overnight.  Has been chronically constipated and currently takes a stool softer, Miralax and fiber gummies..         Type of problem:  Incontinence and pelvic dysfunction.        Re: Yoly Adams   :   1990    This is a new condition.  Condition occurred with:  After pregnancy.  Where condition occurred: other.  Patient reports pain:  Vaginal.  Pain is described as aching and sharp and is intermittent.  Pain is the same all the time.  Since onset symptoms are unchanged.  Symptoms are exacerbated by intercourse, coughing and using a tampon  and relieved by nothing.  Special tests included:  MRI.    Restrictions due to condition include:  Working in normal job without restrictions.  Barriers include:  None as reported by patient.    Objective:  Pelvic Dysfunction Evaluation:    Bladder/Pelvic Problems:    Dyspareunia:  Grade 3      Flexibility:    Tightness present at:Adductors and Gluteals    External Assessment:    Skin Condition:  Irritation  Scars:  Well healed and tender  Tissue Symmetry:  Normal  Muscle Contraction/Perineal Mobility:  Slight lift, no urogential triangle descent and substitution    Internal Assessment:    Contraction/Grade:  Weak squeeze, 2 second hold (2)  Accessory Muscle use-Adductors:  X    SEMG Biofeedback:    Equipment:  MR-20    Suraface electrode placement--Perianal:  X  Baseline EMG PM:  5.7uV    Peak pelvic muscle contraction:  2\" hold 15.0uV/9.3uV/W-R +5.72    10\" hold 16.4uV/6.3uV/W-R +10.08    Position:  SupineAdditional History:  Delivery History:  Vaginal delivery and tearing  Number of Pregnancies: 1  Number of Live Births: 1                   Re: Yoly Adams   :   1990    Assessment/Plan:    Patient is a 32 year old female with pelvic complaints.    Patient has the following significant findings with corresponding treatment plan.                Diagnosis 1:  Pelvic " dysfunction, Pelvic pain  Pain -  manual therapy, self management and education  Decreased ROM/flexibility - manual therapy and therapeutic exercise  Decreased strength - therapeutic exercise and therapeutic activities  Impaired muscle performance - biofeedback and neuro re-education  Decreased function - therapeutic activities      Previous and current functional limitations:  (See Goal Flow Sheet for this information)    Short term and Long term goals: (See Goal Flow Sheet for this information)     Communication ability:  Patient appears to be able to clearly communicate and understand verbal and written communication and follow directions correctly.  Treatment Explanation - The following has been discussed with the patient:   RX ordered/plan of care  Anticipated outcomes  Possible risks and side effects  This patient would benefit from PT intervention to resume normal activities.   Rehab potential is good.    Frequency:  1 X week, once daily  Duration:  for 12 weeks  Discharge Plan:  Achieve all LTG.  Independent in home treatment program.  Reach maximal therapeutic benefit.    Please refer to the daily flowsheet for treatment today, total treatment time and time spent performing 1:1 timed codes.           Thank you for your referral.    INQUIRIES  Therapist: Marva Palmer DPT  [facility]  [address]  [facility] [address]  Phone: [phone]  Fax: [phone]

## 2022-04-06 NOTE — PROGRESS NOTES
Physical Therapy Initial Evaluation  Subjective:    Patient Health History  Yoly Adams being seen for Pelvic pain.     Problem began: 1/8/2021.   Problem occurred: Childbirth   Pain is reported as 9/10 on pain scale.  General health as reported by patient is good.  Pertinent medical history includes: changes in bowel/bladder, depression, mental illness, migraines/headaches, numbness/tingling, osteoarthritis, pain at night/rest, weakness, unexplained weight loss and other. Other medical history details: Pelvic pain.     Medical allergies: adhesives and other. Other medical allergies details: Botox.   Surgeries include:  Other. Other surgery history details: Oral and eye.    Current medications:  Anti-depressants, anti-inflammatory, high blood pressure medication, pain medication, sleep medication, steroids and other. Other medications details: See list.    Current occupation is Self employed (part time).   Primary job tasks include:  Computer work, driving, lifting/carrying, prolonged sitting, prolonged standing, pushing/pulling and repetitive tasks.                  Therapist Generated HPI Evaluation  Problem details: Patient reports since the delivery of her first vaginal delivery 1/8/2021.  She had a 2nd degree tear which she healed well after.  She has had to stop having intercourse due to pain 9/10 PL.  Does also have pain with tampon use as well.  Does have some urgency and does need to strain at times to start her flow of urine.  Reports she has mixed incontinence about 2-5x/day.  Is leaking overnight and needs to wear a pad overnight.  Has been chronically constipated and currently takes a stool softer, Miralax and fiber gummies..         Type of problem:  Incontinence and pelvic dysfunction.    This is a new condition.  Condition occurred with:  After pregnancy.  Where condition occurred: other.  Patient reports pain:  Vaginal.  Pain is described as aching and sharp and is intermittent.  Pain is the  "same all the time.  Since onset symptoms are unchanged.  Symptoms are exacerbated by intercourse, coughing and using a tampon  and relieved by nothing.  Special tests included:  MRI.    Restrictions due to condition include:  Working in normal job without restrictions.  Barriers include:  None as reported by patient.                        Objective:  System                                 Pelvic Dysfunction Evaluation:    Bladder/Pelvic Problems:        Dyspareunia:  Grade 3      Flexibility:    Tightness present at:Adductors and Gluteals            External Assessment:    Skin Condition:  Irritation  Scars:  Well healed and tender    Tissue Symmetry:  Normal    Muscle Contraction/Perineal Mobility:  Slight lift, no urogential triangle descent and substitution  Internal Assessment:      Contraction/Grade:  Weak squeeze, 2 second hold (2)      Accessory Muscle use-Adductors:  X    SEMG Biofeedback:    Equipment:  MR-20    Suraface electrode placement--Perianal:  X  Baseline EMG PM:  5.7uV    Peak pelvic muscle contraction:  2\" hold 15.0uV/9.3uV/W-R +5.72    10\" hold 16.4uV/6.3uV/W-R +10.08      Position:  SupineAdditional History:  Delivery History:  Vaginal delivery and tearing  Number of Pregnancies: 1  Number of Live Births: 1                       General     ROS    Assessment/Plan:    Patient is a 32 year old female with pelvic complaints.    Patient has the following significant findings with corresponding treatment plan.                Diagnosis 1:  Pelvic dysfunction, Pelvic pain  Pain -  manual therapy, self management and education  Decreased ROM/flexibility - manual therapy and therapeutic exercise  Decreased strength - therapeutic exercise and therapeutic activities  Impaired muscle performance - biofeedback and neuro re-education  Decreased function - therapeutic activities      Previous and current functional limitations:  (See Goal Flow Sheet for this information)    Short term and Long term goals: " (See Goal Flow Sheet for this information)     Communication ability:  Patient appears to be able to clearly communicate and understand verbal and written communication and follow directions correctly.  Treatment Explanation - The following has been discussed with the patient:   RX ordered/plan of care  Anticipated outcomes  Possible risks and side effects  This patient would benefit from PT intervention to resume normal activities.   Rehab potential is good.    Frequency:  1 X week, once daily  Duration:  for 12 weeks  Discharge Plan:  Achieve all LTG.  Independent in home treatment program.  Reach maximal therapeutic benefit.    Please refer to the daily flowsheet for treatment today, total treatment time and time spent performing 1:1 timed codes.

## 2022-04-07 ENCOUNTER — OFFICE VISIT (OUTPATIENT)
Dept: UROLOGY | Facility: CLINIC | Age: 32
End: 2022-04-07
Payer: COMMERCIAL

## 2022-04-07 VITALS
SYSTOLIC BLOOD PRESSURE: 111 MMHG | HEART RATE: 71 BPM | DIASTOLIC BLOOD PRESSURE: 74 MMHG | HEIGHT: 64 IN | WEIGHT: 128 LBS | BODY MASS INDEX: 21.85 KG/M2

## 2022-04-07 DIAGNOSIS — N94.10 DYSPAREUNIA, FEMALE: ICD-10-CM

## 2022-04-07 DIAGNOSIS — N39.46 MIXED INCONTINENCE: ICD-10-CM

## 2022-04-07 DIAGNOSIS — M62.89 HIGH-TONE PELVIC FLOOR DYSFUNCTION: Primary | ICD-10-CM

## 2022-04-07 DIAGNOSIS — M79.18 MYALGIA OF PELVIC FLOOR: ICD-10-CM

## 2022-04-07 PROCEDURE — 51798 US URINE CAPACITY MEASURE: CPT | Performed by: UROLOGY

## 2022-04-07 PROCEDURE — 99204 OFFICE O/P NEW MOD 45 MIN: CPT | Mod: 25 | Performed by: UROLOGY

## 2022-04-07 RX ORDER — METOPROLOL SUCCINATE 25 MG/1
TABLET, EXTENDED RELEASE ORAL
COMMUNITY
Start: 2022-04-04 | End: 2022-06-01 | Stop reason: SINTOL

## 2022-04-07 RX ORDER — DIAZEPAM 5 MG
TABLET ORAL
Qty: 40 TABLET | Refills: 3 | Status: SHIPPED | OUTPATIENT
Start: 2022-04-07
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
## 2022-04-07 NOTE — PATIENT INSTRUCTIONS
Websites with free information:    [employer] patient website: www.voicesforpfd.org    Total Control Program: www.totalcontrolprogram.com    Use the vaginal valium as directed    Please continue the pelvic floor therapy    It was a pleasure meeting with you today.  Thank you for allowing me and my team the privilege of caring for you today.  YOU are the reason we are here, and I truly hope we provided you with the excellent service you deserve.  Please let us know if there is anything else we can do for you so that we can be sure you are leaving completely satisfied with your care experience.

## 2022-04-07 NOTE — NURSING NOTE
"Chief Complaint   Patient presents with     Consult     Dyspareunia - history of balta-danlos syndrome       Blood pressure 111/74, pulse 71, height 1.626 m (5' 4\"), weight 58.1 kg (128 lb). Body mass index is 21.97 kg/m .    Patient Active Problem List   Diagnosis     Pain in both hands     Acne     Adjustment disorder with mixed anxiety and depressed mood     Carpal tunnel syndrome     Chronic constipation     Fatigue     Generalized anxiety disorder     Dyspareunia due to medical condition in female     Muscle pain     Hypermobile Balta-Danlos syndrome     Female stress incontinence     Chronic nonintractable headache     Mast cell activation syndrome (H)     Somatic dysfunction of pelvic region     Pelvic pain in female       Allergies   Allergen Reactions     Botox Cosmetic Anaphylaxis, Difficulty breathing and Swelling     Animal Dander Cough, Dermatitis, Difficulty breathing, Dizziness, Headache, Hives, Itching, Nausea, Rash and Swelling     Seasonal Allergies Anxiety, Cramps, Dermatitis, Dizziness, Fatigue, GI Disturbance, Headache, Hives, Itching, Muscle Pain (Myalgia), Nausea, Photosensitivity and Rash     Vanicream Dermatitis, Hives, Itching, Rash and Swelling       Current Outpatient Medications   Medication Sig Dispense Refill     acetaminophen (TYLENOL) 325 MG tablet 2 tablets as needed       ascorbic acid (VITAMIN C) 250 MG CHEW chewable tablet        budesonide (ENTOCORT EC) 3 MG EC capsule        buPROPion (WELLBUTRIN XL) 150 MG 24 hr tablet Take 150 mg by mouth daily       buPROPion (WELLBUTRIN) 75 MG tablet TAKE 1 TABLET BY MOUTH DAILY. TAKE ALONG WITH 150 MG FOR A TOTAL DAILY DOSE  MG.       calcium carbonate (OS-MICHELLE) 500 MG tablet        calcium polycarbophil (FIBERCON) 625 MG tablet        cromolyn (GASTROCROM) 100 MG/5ML (HIGH CONC) solution        Docusate Sodium (COLACE PO)        doxepin (SINEQUAN) 10 MG capsule        famotidine (PEPCID) 20 MG tablet        Fexofenadine HCl " (ALLEGRA ALLERGY PO)        fish oil-omega-3 fatty acids 500 MG capsule        Glucosamine HCl-MSM (GLUCOSAMINE-MSM PO)        hydrOXYzine (ATARAX) 10 MG tablet TAKE 2 TABLETS BY MOUTH TWICE DAILY AS NEEDED FOR ANXIETY       hydrOXYzine (ATARAX) 25 MG tablet TAKE 1 TABLET BY MOUTH 1 TIME AT BEDTIME AS NEEDED       ibuprofen (ADVIL/MOTRIN) 800 MG tablet Take 800 mg by mouth       ketotifen (ZADITOR) 0.025 % ophthalmic solution Place 1 drop into both eyes daily       Lactobacillus Rhamnosus, GG, (RA PROBIOTIC DIGESTIVE CARE) CAPS Take 1 capsule by mouth       magnesium 250 MG tablet        methylphenidate (RITALIN) 10 MG tablet        metoprolol succinate ER (TOPROL-XL) 25 MG 24 hr tablet        montelukast (SINGULAIR) 10 MG tablet        olopatadine (PATADAY) 0.2 % ophthalmic solution        Polyethylene Glycol 3350 (MIRALAX PO)        polyethylene glycol-propylene glycol (SYSTANE ULTRA) 0.4-0.3 % SOLN ophthalmic solution        Prenatal Vit-Iron Carbonyl-FA (PRENATAL PLUS IRON PO)        Probiotic Product (SOLUBLE FIBER/PROBIOTICS PO)        psyllium (METAMUCIL/KONSYL) capsule        Quercetin 250 MG TABS        rizatriptan (MAXALT) 10 MG tablet        sertraline (ZOLOFT) 50 MG tablet        triamcinolone (NASACORT) 55 MCG/ACT nasal aerosol Spray 2 sprays into both nostrils daily       Vitamin D3 (CHOLECALCIFEROL) 25 mcg (1000 units) tablet          Social History     Tobacco Use     Smoking status: Never Smoker     Smokeless tobacco: Never Used   Substance Use Topics     Alcohol use: Never     Alcohol/week: 3.0 standard drinks     Types: 3 Standard drinks or equivalent per week     Drug use: Never       Radha Avery CMA  4/7/2022  2:21 PM

## 2022-04-12 ENCOUNTER — LAB (OUTPATIENT)
Dept: LAB | Facility: CLINIC | Age: 32
End: 2022-04-12
Payer: COMMERCIAL

## 2022-04-12 DIAGNOSIS — L50.3 DERMATOGRAPHIC URTICARIA: ICD-10-CM

## 2022-04-12 DIAGNOSIS — R58 HEMORRHAGE OF BLOOD VESSEL: ICD-10-CM

## 2022-04-12 DIAGNOSIS — D89.40 MAST CELL ACTIVATION (H): ICD-10-CM

## 2022-04-12 DIAGNOSIS — R53.83 FATIGUE: ICD-10-CM

## 2022-04-12 PROBLEM — N39.46 MIXED INCONTINENCE: Status: ACTIVE | Noted: 2022-04-12

## 2022-04-12 PROBLEM — M79.18 MYALGIA OF PELVIC FLOOR: Status: ACTIVE | Noted: 2022-04-12

## 2022-04-12 PROBLEM — M62.89 HIGH-TONE PELVIC FLOOR DYSFUNCTION: Status: ACTIVE | Noted: 2022-04-12

## 2022-04-12 PROCEDURE — 88185 FLOWCYTOMETRY/TC ADD-ON: CPT | Mod: 90

## 2022-04-12 PROCEDURE — 85520 HEPARIN ASSAY: CPT

## 2022-04-12 PROCEDURE — 88184 FLOWCYTOMETRY/ TC 1 MARKER: CPT | Mod: 90

## 2022-04-12 PROCEDURE — 83516 IMMUNOASSAY NONANTIBODY: CPT | Mod: 90

## 2022-04-12 PROCEDURE — 82785 ASSAY OF IGE: CPT

## 2022-04-12 PROCEDURE — 84150 ASSAY OF PROSTAGLANDIN: CPT | Mod: 90

## 2022-04-12 PROCEDURE — 36415 COLL VENOUS BLD VENIPUNCTURE: CPT

## 2022-04-12 PROCEDURE — 86316 IMMUNOASSAY TUMOR OTHER: CPT | Mod: 90

## 2022-04-12 PROCEDURE — 83520 IMMUNOASSAY QUANT NOS NONAB: CPT

## 2022-04-12 PROCEDURE — 83088 ASSAY OF HISTAMINE: CPT | Mod: 90

## 2022-04-12 PROCEDURE — 99000 SPECIMEN HANDLING OFFICE-LAB: CPT

## 2022-04-13 LAB — UFH PPP CHRO-ACNC: <0.1 IU/ML

## 2022-04-14 ENCOUNTER — TRANSFERRED RECORDS (OUTPATIENT)
Dept: HEALTH INFORMATION MANAGEMENT | Facility: CLINIC | Age: 32
End: 2022-04-14

## 2022-04-14 ENCOUNTER — LAB (OUTPATIENT)
Dept: LAB | Facility: CLINIC | Age: 32
End: 2022-04-14
Payer: COMMERCIAL

## 2022-04-14 ENCOUNTER — OFFICE VISIT (OUTPATIENT)
Dept: URGENT CARE | Facility: URGENT CARE | Age: 32
End: 2022-04-14

## 2022-04-14 DIAGNOSIS — D68.9 BLOOD CLOTTING DISORDER (H): ICD-10-CM

## 2022-04-14 DIAGNOSIS — D68.9 BLOOD CLOTTING DISORDER (H): Primary | ICD-10-CM

## 2022-04-14 DIAGNOSIS — L50.3 DERMATOGRAPHIC URTICARIA: ICD-10-CM

## 2022-04-14 DIAGNOSIS — R53.83 FATIGUE: ICD-10-CM

## 2022-04-14 DIAGNOSIS — Z53.9 DIAGNOSIS NOT YET DEFINED: Primary | ICD-10-CM

## 2022-04-14 DIAGNOSIS — R58 HEMORRHAGE OF BLOOD VESSEL: ICD-10-CM

## 2022-04-14 DIAGNOSIS — D89.40 MAST CELL ACTIVATION (H): ICD-10-CM

## 2022-04-14 LAB
BASOPHILS # BLD AUTO: 0 10E3/UL (ref 0–0.2)
BASOPHILS NFR BLD AUTO: 1 %
CF REDUC 60M P MA LENFR BLD TEG: 1.3 % (ref 0–15)
CFT BLD TEG: 1.9 MINUTE (ref 1–3)
CI (COAGULATION INDEX)(Z) NON NATIVE: 0 (ref -3–3)
CLOT ANGLE BLD TEG: 46.1 DEGREES (ref 53–72)
CLOT INIT BLD TEG: 5.6 MINUTE (ref 5–10)
CLOT LYSIS 30M P MA LENFR BLD TEG: 0 % (ref 0–8)
CLOT STRENGTH BLD TEG: 12.3 KD/SC (ref 4.5–11)
EOSINOPHIL # BLD AUTO: 0.1 10E3/UL (ref 0–0.7)
EOSINOPHIL NFR BLD AUTO: 1 %
ERYTHROCYTE [DISTWIDTH] IN BLOOD BY AUTOMATED COUNT: 12.4 % (ref 10–15)
FERRITIN SERPL-MCNC: 28 NG/ML (ref 12–150)
HCT VFR BLD AUTO: 37.6 % (ref 35–47)
HGB BLD-MCNC: 12.9 G/DL (ref 11.7–15.7)
IMM GRANULOCYTES # BLD: 0 10E3/UL
IMM GRANULOCYTES NFR BLD: 0 %
LYMPHOCYTES # BLD AUTO: 1.9 10E3/UL (ref 0.8–5.3)
LYMPHOCYTES NFR BLD AUTO: 37 %
MCF BLD TEG: 71 MM (ref 50–70)
MCH RBC QN AUTO: 30 PG (ref 26.5–33)
MCHC RBC AUTO-ENTMCNC: 34.3 G/DL (ref 31.5–36.5)
MCV RBC AUTO: 87 FL (ref 78–100)
MONOCYTES # BLD AUTO: 0.2 10E3/UL (ref 0–1.3)
MONOCYTES NFR BLD AUTO: 4 %
NEUTROPHILS # BLD AUTO: 3 10E3/UL (ref 1.6–8.3)
NEUTROPHILS NFR BLD AUTO: 57 %
NRBC # BLD AUTO: 0 10E3/UL
NRBC BLD AUTO-RTO: 0 /100
PLATELET # BLD AUTO: 233 10E3/UL (ref 150–450)
RBC # BLD AUTO: 4.3 10E6/UL (ref 3.8–5.2)
WBC # BLD AUTO: 5.2 10E3/UL (ref 4–11)

## 2022-04-14 PROCEDURE — 85240 CLOT FACTOR VIII AHG 1 STAGE: CPT

## 2022-04-14 PROCEDURE — 99000 SPECIMEN HANDLING OFFICE-LAB: CPT

## 2022-04-14 PROCEDURE — 85025 COMPLETE CBC W/AUTO DIFF WBC: CPT

## 2022-04-14 PROCEDURE — 84150 ASSAY OF PROSTAGLANDIN: CPT | Mod: 90

## 2022-04-14 PROCEDURE — 85396 CLOTTING ASSAY WHOLE BLOOD: CPT

## 2022-04-14 PROCEDURE — 82728 ASSAY OF FERRITIN: CPT

## 2022-04-14 PROCEDURE — 82570 ASSAY OF URINE CREATININE: CPT | Mod: 90

## 2022-04-14 PROCEDURE — 82542 COL CHROMOTOGRAPHY QUAL/QUAN: CPT | Mod: 90

## 2022-04-14 PROCEDURE — 84466 ASSAY OF TRANSFERRIN: CPT

## 2022-04-14 PROCEDURE — 83088 ASSAY OF HISTAMINE: CPT | Mod: 90

## 2022-04-14 PROCEDURE — 36415 COLL VENOUS BLD VENIPUNCTURE: CPT

## 2022-04-14 PROCEDURE — 85245 CLOT FACTOR VIII VW RISTOCTN: CPT

## 2022-04-14 PROCEDURE — 82542 COL CHROMOTOGRAPHY QUAL/QUAN: CPT

## 2022-04-14 PROCEDURE — 85390 FIBRINOLYSINS SCREEN I&R: CPT | Mod: 26 | Performed by: PATHOLOGY

## 2022-04-14 PROCEDURE — 85246 CLOT FACTOR VIII VW ANTIGEN: CPT

## 2022-04-14 NOTE — PROGRESS NOTES
Pt was called twice and not present. 04/14/22. 12:20pm. Phone call and leave message if pt want to come back or call back at [phone].     jocelynn hernandez

## 2022-04-15 LAB
FACT VIII ACT/NOR PPP: 94 % (ref 55–200)
HISTAMINE BLD-SCNC: 947 NMOL/L
IGE SERPL-ACNC: 27 KU/L (ref 0–114)
IRON SATN MFR SERPL: 22 % (ref 20–50)
IRON SERPL-MCNC: 87 UG/DL (ref 42–175)
Lab: NORMAL
PERFORMING LABORATORY: NORMAL
SPECIMEN STATUS: NORMAL
TEST NAME: NORMAL
TIBC SERPL-MCNC: 389 UG/DL (ref 313–563)
TRANSFERRIN SERPL-MCNC: 311 MG/DL (ref 212–360)
TRYPTASE SERPL-MCNC: 2.6 UG/L
VON WILLEBRAND EVAL PPP-IMP: NORMAL
VWF AG ACT/NOR PPP IA: 104 % (ref 50–200)
VWF:AC ACT/NOR PPP IA: 89 % (ref 50–180)

## 2022-04-16 LAB
CREAT UR-MCNC: 40 MG/DL
HISTAMINE 24H UR-MRATE: 39 UG/D
HISTAMINE UR-SCNC: 114 NMOL/L
HISTAMINE/CREAT 24H UR-SRTO: 285 NMOL/G CRT

## 2022-04-18 LAB
COLLECT DURATION TIME UR: 24 H
CREAT 24H UR-MCNC: 41 MG/DL
CREAT 24H UR-MRATE: 1253 MG/24 H (ref 603–1783)
CREAT UR-MCNC: 40 MG/DL (ref 16–326)
ME-HISTAMINE/CREAT 24H UR: 116 MCG/G CR (ref 30–200)
ME-HISTAMINE/CREAT UR: 122 MCG/G CR (ref 30–200)
SPECIMEN VOL 24H UR: 3055 ML
VWF MULTIMERS PPP IB: NORMAL

## 2022-04-20 LAB
2,3-DINOR 11B-PG F2A/CREAT 24H UR: 900 PG/MG CR
ANTI IGE ANTIBODY: NORMAL
CGA SERPL-MCNC: 73 NG/ML
COLLECT DURATION TIME UR: 24 H
CREAT 24H UR-MCNC: 41 MG/DL
CREAT 24H UR-MRATE: 1253 MG/24 H (ref 603–1783)
SPECIMEN VOL 24H UR: 3055 ML
URTICARIA INDUCED BASOPHIL ACTIVATION: 0 %

## 2022-04-23 LAB — MAYO MISC RESULT: NORMAL

## 2022-04-27 ENCOUNTER — THERAPY VISIT (OUTPATIENT)
Dept: PHYSICAL THERAPY | Facility: CLINIC | Age: 32
End: 2022-04-27
Payer: COMMERCIAL

## 2022-04-27 DIAGNOSIS — M99.05 SOMATIC DYSFUNCTION OF PELVIC REGION: Primary | ICD-10-CM

## 2022-04-27 DIAGNOSIS — R10.2 PELVIC PAIN IN FEMALE: ICD-10-CM

## 2022-04-27 LAB
PROSTAGLANDIN D2 SERPL-MCNC: NORMAL PG/ML
PROSTAGLANDIN D2 [MASS/VOLUME] IN URINE: NORMAL NG/ML

## 2022-04-27 PROCEDURE — 97112 NEUROMUSCULAR REEDUCATION: CPT | Mod: GP | Performed by: PHYSICAL THERAPIST

## 2022-04-27 PROCEDURE — 97530 THERAPEUTIC ACTIVITIES: CPT | Mod: GP | Performed by: PHYSICAL THERAPIST

## 2022-04-27 PROCEDURE — 97140 MANUAL THERAPY 1/> REGIONS: CPT | Mod: GP | Performed by: PHYSICAL THERAPIST

## 2022-05-18 ENCOUNTER — THERAPY VISIT (OUTPATIENT)
Dept: PHYSICAL THERAPY | Facility: CLINIC | Age: 32
End: 2022-05-18
Payer: COMMERCIAL

## 2022-05-18 DIAGNOSIS — R10.2 PELVIC PAIN IN FEMALE: ICD-10-CM

## 2022-05-18 DIAGNOSIS — M99.05 SOMATIC DYSFUNCTION OF PELVIC REGION: Primary | ICD-10-CM

## 2022-05-18 PROCEDURE — 97140 MANUAL THERAPY 1/> REGIONS: CPT | Mod: GP | Performed by: PHYSICAL THERAPIST

## 2022-05-18 PROCEDURE — 97112 NEUROMUSCULAR REEDUCATION: CPT | Mod: GP | Performed by: PHYSICAL THERAPIST

## 2022-05-18 PROCEDURE — 97530 THERAPEUTIC ACTIVITIES: CPT | Mod: GP | Performed by: PHYSICAL THERAPIST

## 2022-05-23 ENCOUNTER — TELEPHONE (OUTPATIENT)
Dept: OTOLARYNGOLOGY | Facility: CLINIC | Age: 32
End: 2022-05-23
Payer: COMMERCIAL

## 2022-05-23 NOTE — TELEPHONE ENCOUNTER
Patient called wanting to schedule a turbinate reduction with Dr. Schmidt. Informed patient that writer would need to send a message to Dr. Schmidt's team for follow up and clarification as the plan was based on the note to returned to clinic in 6 months. Does not mention a turbinate reduction. In clinic vs in the OR. Patient states she would like to proceed and this was something mentioned at the consult with Dr. Schmidt.       Nuris Washington on 5/23/2022 at 9:42 AM

## 2022-05-24 ENCOUNTER — PREP FOR PROCEDURE (OUTPATIENT)
Dept: OTOLARYNGOLOGY | Facility: CLINIC | Age: 32
End: 2022-05-24
Payer: COMMERCIAL

## 2022-05-24 ENCOUNTER — MYC MEDICAL ADVICE (OUTPATIENT)
Dept: OTOLARYNGOLOGY | Facility: CLINIC | Age: 32
End: 2022-05-24
Payer: COMMERCIAL

## 2022-05-24 DIAGNOSIS — J34.89 NASAL OBSTRUCTION: Primary | ICD-10-CM

## 2022-05-24 RX ORDER — DEXAMETHASONE SODIUM PHOSPHATE 4 MG/ML
10 INJECTION, SOLUTION INTRA-ARTICULAR; INTRALESIONAL; INTRAMUSCULAR; INTRAVENOUS; SOFT TISSUE ONCE
Status: CANCELLED | OUTPATIENT
Start: 2022-05-24 | End: 2022-05-24

## 2022-05-24 NOTE — TELEPHONE ENCOUNTER
Surgery order placed, called patient to discuss date for surgery. LM with direct call back number and will send details through [employer].     Sandra Bro RN on 5/24/2022 at 12:24 PM

## 2022-05-24 NOTE — TELEPHONE ENCOUNTER
Surgery Teaching    1. Someone from our scheduling department will call you within the next few days to get you scheduled with your provider for surgery. If no one has called you in one week, please notify us.    2. You must have a physical exam (called  history and physical ) within 30 days of surgery. You may complete this with your primary care provider.   A. If your provider is outside of the [facility] network please have them complete the preoperative forms provided to you in the surgery packet you will be mailed and be sure to have your provider fax them to the appropriate location prior to surgery. For surgery at the [facility] the fax number is:[phone]. For surgery at the [facility] the fax number is [phone].  B. In some cases we may have you see our Preoperative Assessment Center. If we have expressed this to you, our  will set up your appointment with them when they call to set up your surgery.    3. Complete a COVID test 4 days prior to surgery. You will need to have this done regardless of whether you have had the COVID vaccine. If you have the test performed at a clinic outside of the network, you will need to have the test results faxed to us.    4. For same-day surgery, you must arrange for an adult to take you home from the Center. An adult must stay with you for the first 24 hours after surgery. You cannot drive for 24 hours.     5. Ask your doctor what medicines are safe before surgery. For over the counter medications and supplements it is advised that you do NOT TAKE MOTRIN, IBUPROFEN, ASPIRIN, ALEVE, GARLIC SUPPLEMENTS or FISH OIL x 7 days prior to surgery (to prevent excess bleeding and bruising at time of surgery). If your provider advises you to take any medication the morning of surgery you should take this with a sip of water.    6. A few days prior to surgery a nurse will call you to review your health history and instructions for before and after surgery. They will give you your  final arrival time based upon your scheduled arrival time for surgery.    7. Call the surgical team if there's any change in your health prior to surgery. Things you should call for include but are not limited to signs of a cold or the flu (sore throat, runny nose, cough, rash, fever). Other things to notify them for is for any open wounds (cuts, scrapes, scratches) near to the surgery site.    8. If you drink alcohol, stop drinking alcohol at least 24 hours before surgery.    9. If you smoke, stop or at least cut down on smoking 24 hours before surgery.    10.Take a bath or shower the night before and the morning of surgery (as told by your surgeon). Use an antiseptic soap. If your doctor does not give you special soap, buy Hibiclens or Yasemin-Stat at the drug store or ask the pharmacist to suggest a brand. You will wash with this from the neck down, washing your hair and face as you would normally.   A. When you are done with your shower please be sure to use clean towels to dry with, have clean linens on your bed, and put on clean clothes each time.   B. DO NOT put on lotion, powder, perfume, deodorant or make-up after bathing.    11. You can eat a normal meal the night before surgery. Do not eat any solid foods or drink any milk products for 8 hours before surgery.     12. You may drink clear liquids until 2 hours before surgery. Clear liquids include water, Gatorade, apple juice and liquids you can see through.    13. No eating or drinking 2 hours prior to surgery until after surgery. Your post op team will review any diet limitations you might have and when you can start eating and drinking again after surgery.      If you have any questions before or after surgery please call:    VENKATESH Alba  [facility]  Department of Otolaryngology  [phone]        Patient confirmed she wants 06/06/22 for her surgery date. I will let Nuris know.

## 2022-05-25 ENCOUNTER — HOSPITAL ENCOUNTER (OUTPATIENT)
Facility: AMBULATORY SURGERY CENTER | Age: 32
End: 2022-05-25
Attending: OTOLARYNGOLOGY
Payer: COMMERCIAL

## 2022-05-25 DIAGNOSIS — J34.89 NASAL OBSTRUCTION: ICD-10-CM

## 2022-05-27 DIAGNOSIS — Z11.59 ENCOUNTER FOR SCREENING FOR OTHER VIRAL DISEASES: Primary | ICD-10-CM

## 2022-06-01 ENCOUNTER — VIRTUAL VISIT (OUTPATIENT)
Dept: SLEEP MEDICINE | Facility: CLINIC | Age: 32
End: 2022-06-01
Payer: COMMERCIAL

## 2022-06-01 VITALS — HEIGHT: 64 IN | BODY MASS INDEX: 22.02 KG/M2 | WEIGHT: 129 LBS

## 2022-06-01 DIAGNOSIS — F32.A ANXIETY AND DEPRESSION: ICD-10-CM

## 2022-06-01 DIAGNOSIS — G47.19 EXCESSIVE DAYTIME SLEEPINESS: ICD-10-CM

## 2022-06-01 DIAGNOSIS — R79.89 OTHER SPECIFIED ABNORMAL FINDINGS OF BLOOD CHEMISTRY: ICD-10-CM

## 2022-06-01 DIAGNOSIS — F51.04 CHRONIC INSOMNIA: Primary | ICD-10-CM

## 2022-06-01 DIAGNOSIS — F41.9 ANXIETY AND DEPRESSION: ICD-10-CM

## 2022-06-01 DIAGNOSIS — G25.81 RESTLESS LEGS SYNDROME (RLS): ICD-10-CM

## 2022-06-01 PROCEDURE — 99417 PROLNG OP E/M EACH 15 MIN: CPT | Mod: 95 | Performed by: PHYSICIAN ASSISTANT

## 2022-06-01 PROCEDURE — 99205 OFFICE O/P NEW HI 60 MIN: CPT | Mod: 95 | Performed by: PHYSICIAN ASSISTANT

## 2022-06-01 RX ORDER — RIBOFLAVIN (VITAMIN B2) 100 MG
TABLET ORAL
COMMUNITY
Start: 2022-04-01

## 2022-06-01 RX ORDER — OMEGA-3 FATTY ACIDS/FISH OIL 300-1000MG
CAPSULE ORAL
COMMUNITY
Start: 2021-01-08

## 2022-06-01 RX ORDER — CLINDAMYCIN PHOSPHATE 10 UG/ML
LOTION TOPICAL
COMMUNITY
Start: 2022-05-19

## 2022-06-01 RX ORDER — MENTHOL 40 MG/ML
GEL TOPICAL
COMMUNITY
Start: 2022-01-01

## 2022-06-01 ASSESSMENT — SLEEP AND FATIGUE QUESTIONNAIRES
HOW LIKELY ARE YOU TO NOD OFF OR FALL ASLEEP WHEN YOU ARE A PASSENGER IN A CAR FOR AN HOUR WITHOUT A BREAK: MODERATE CHANCE OF DOZING
HOW LIKELY ARE YOU TO NOD OFF OR FALL ASLEEP WHILE SITTING QUIETLY AFTER LUNCH WITHOUT ALCOHOL: MODERATE CHANCE OF DOZING
HOW LIKELY ARE YOU TO NOD OFF OR FALL ASLEEP WHILE LYING DOWN TO REST IN THE AFTERNOON WHEN CIRCUMSTANCES PERMIT: HIGH CHANCE OF DOZING
HOW LIKELY ARE YOU TO NOD OFF OR FALL ASLEEP WHILE SITTING AND READING: HIGH CHANCE OF DOZING
HOW LIKELY ARE YOU TO NOD OFF OR FALL ASLEEP WHILE SITTING INACTIVE IN A PUBLIC PLACE: MODERATE CHANCE OF DOZING
HOW LIKELY ARE YOU TO NOD OFF OR FALL ASLEEP WHILE SITTING AND TALKING TO SOMEONE: WOULD NEVER DOZE
HOW LIKELY ARE YOU TO NOD OFF OR FALL ASLEEP WHILE WATCHING TV: MODERATE CHANCE OF DOZING
HOW LIKELY ARE YOU TO NOD OFF OR FALL ASLEEP IN A CAR, WHILE STOPPED FOR A FEW MINUTES IN TRAFFIC: SLIGHT CHANCE OF DOZING

## 2022-06-01 NOTE — PATIENT INSTRUCTIONS
Insomnia and Behavioral Sleep Medicine Program    The [facility] Insomnia and Behavioral Sleep Medicine Program provides non-drug treatment for sleep problems including:    Cognitive-behavioral Therapies for Insomnia (CBT-I)  Management of Shift-work and Jet Lag  Management of Delayed, Advanced and Irregular Circadian Rhythm Sleep Disorders  Imagery Rehearsal Therapy (IRT) for Nightmare Disorder  PAP Therapy Desensitization    You have been referred for consultation with a sleep psychologist who specializes in behavioral sleep medicine and treatment of insomnia.  The [facility] Insomnia and Behavioral Sleep Medicine Program offers individualized telehealth services through our [facility] and online CBT-I.    Preparing for your Consultation    You will need to keep a Sleep Diary for at least a week prior to your visit. Complete the sleep diary each day first thing after you get up by answering a few key questions about your sleep using our convenient mobile danny or paper sleep diary.  Your answers should be based on your recall of the past 24 hours.  Avoid watching the clock or recording data during the night.     Insomnia  Danny    The Insomnia  mobile danny  is a convenient way to keep track of your sleep prior to your sleep consultation.  Simply download the free danny on your Apple or Android phone and record your information each morning.  The danny includes training, self-assessment, and sleep schedule recommendations.  Prior to your consultation we recommend you use only the sleep diary function. You can e-mail yourself a copy of your sleep diary data by going to the Settings section and using the [facility] User Data function.  During your consultation your provider will review the data with you.          [facility] Sleep Diary    You can also track your sleep using the [facility] paper sleep diary.  You can upload your sleep diary and send it via a Smeet  message, fax it to [phone], or have it with you at the time of your consultation.            CBT-I:  Frequently Asked Questions    What is CBT-I?    Cognitive Behavioral Therapy for Insomnia, also known as CBT-I, is a highly effective non-drug treatment for insomnia. The American College of Physicians recommends CBT-I as the first treatment for chronic insomnia.  Research has shown CBT-I to be safer and more effective long term than sleeping pills.    What does CBT-I involve?     CBT-I targets behaviors that lead to chronic insomnia:  Habits that weaken the bed as a cue for sleep  Habits that weaken your body's sleep drive and sleep/wake clock   Unhelpful sleep thoughts that increase sleep-related worry and arousal.    The process involves 3-6 telehealth visits that guide you to implement proven strategies to get a better night's sleep.    People often see improvement in their sleep within a few weeks. Research shows if you keep practicing the skills you learn your sleep is likely to continue to improve 6-12 months after treatment.    Does this program prescribe or manage sleep medication?    No.  Your prescribing provider is responsible to assist you in managing your sleep medications.  Some people choose to stop using sleep medication prior to or during CBT-I.  Our program can work with your prescribing provider to help reduce or eliminate use of sleep medications.     Getting Started Today!    If you haven't already done so, we recommend you consider making the following changes to your sleep habits prior to your sleep consultation:     Reduce your consumption of caffeine and alcohol.  Both can disrupt sleep and make strengthening your sleep more difficult.  Specifically:    - Avoid caffeine within 6 hours of bedtime   - No more than 3 caffeinated beverages per day (e.g. 8 oz. cup coffee or 12 oz. cup soda)            - No alcohol within 3 hours of bedtime    Make sure your bedroom is quiet, comfortable and  dark.  Noise, light and an uncomfortable sleep space can harm your sleep.      Keep the same sleep schedule 7 days a week.unless you do shift work.      Online CBT-I     If you want to get started today, research indicates that online CBT-I can be effective for some individuals. These programs requires comfort with latia-based or online learning.  However, digital CBT-I programs are not for everyone.  Contraindications include:    Seizure disorders,   Bipolar disorder,   Unstable medical or mental health conditions,   Frailty or risk of falling  Pregnancy    You should consult a sleep specialist before using these resources if you have:    Sleep Apnea  Restless Leg Syndrome  Sleep Walking  REM behavior disorder  Night Terrors  Excessive Daytime Sleepiness  Are engaged in shift work  Use prescription sleep medication    Our Online CBT-I program    If your sleep provider recommends online CBT-I for you , the cost for an entire 6-week program is $40.    To get started, copy and paste the link below which will take you to the landing page to register:                           [email or web address]               If you wish to complete the online CBT-I program but do not plan to follow-up with a sleep provider, you are set to begin the program.    If you are planning to work with an [facility] sleep provider, there are a couple of extra steps you can take to share your sleep data with your sleep provider.  To share sleep log data, go to the left side navigation and click on the  share sleep log  button:         You will be taken to the page below where you will enter  the provider code [facility] into the box.          Once you press the locate button, the information for [facility] will pop up as below.  By pressing the Submit button your data will be sent to our  secure [facility] Instablogs sleep program portal for review by your sleep provider. You will only need to do this step once.                                   Self-help Workbooks for Insomnia    If you have found self-help books useful in the past, you may want to consider reading one of the following books prior to your consultation:    Say Kandis to Insomnia: The Six-Week, Drug-Free Program Developed at [facility].  Yimi Berg MD. Available in paperback, Charisma, and audiobook.    Overcoming Insomnia: A Cognitive-Behavioral Therapy Approach, Workbook.  Gilmar Rdz, PhD  and Gricel Gil, PhD.  Available in paperback and Charisma.    Quiet Your Mind and Get to Sleep: Solutions to Insomnia for Those with Depression, Anxiety, or Chronic Pain.  Sarah Hussein, PhD and Gricel Gil, PhD.  Available in paperback and Charisma
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
## 2022-06-06 ENCOUNTER — TELEPHONE (OUTPATIENT)
Dept: OTOLARYNGOLOGY | Facility: CLINIC | Age: 32
End: 2022-06-06
Payer: COMMERCIAL

## 2022-06-06 NOTE — TELEPHONE ENCOUNTER
Called pt to assist with scheduling pre op. Pt stated she is going to schedule with her PCP. Advised pt if she is needing assistance or changes her mind and would like to se seen here she can call us at [phone] to schedule with our NP clinic. Pt understood.     Ronnie Esparza on 6/6/2022 at 3:26 PM

## 2022-06-09 ENCOUNTER — VIRTUAL VISIT (OUTPATIENT)
Dept: SLEEP MEDICINE | Facility: CLINIC | Age: 32
End: 2022-06-09
Attending: PHYSICIAN ASSISTANT
Payer: COMMERCIAL

## 2022-06-09 VITALS — WEIGHT: 130 LBS | BODY MASS INDEX: 22.2 KG/M2 | HEIGHT: 64 IN

## 2022-06-09 DIAGNOSIS — F51.04 CHRONIC INSOMNIA: Primary | ICD-10-CM

## 2022-06-09 DIAGNOSIS — G47.26 SHIFTING SLEEP-WORK SCHEDULE: ICD-10-CM

## 2022-06-09 PROCEDURE — 90791 PSYCH DIAGNOSTIC EVALUATION: CPT | Mod: 95 | Performed by: PSYCHOLOGIST

## 2022-06-09 ASSESSMENT — SLEEP AND FATIGUE QUESTIONNAIRES
HOW LIKELY ARE YOU TO NOD OFF OR FALL ASLEEP WHILE LYING DOWN TO REST IN THE AFTERNOON WHEN CIRCUMSTANCES PERMIT: HIGH CHANCE OF DOZING
HOW LIKELY ARE YOU TO NOD OFF OR FALL ASLEEP WHILE WATCHING TV: SLIGHT CHANCE OF DOZING
HOW LIKELY ARE YOU TO NOD OFF OR FALL ASLEEP WHILE SITTING AND READING: HIGH CHANCE OF DOZING
HOW LIKELY ARE YOU TO NOD OFF OR FALL ASLEEP WHILE SITTING INACTIVE IN A PUBLIC PLACE: HIGH CHANCE OF DOZING
HOW LIKELY ARE YOU TO NOD OFF OR FALL ASLEEP WHILE SITTING QUIETLY AFTER LUNCH WITHOUT ALCOHOL: MODERATE CHANCE OF DOZING
HOW LIKELY ARE YOU TO NOD OFF OR FALL ASLEEP IN A CAR, WHILE STOPPED FOR A FEW MINUTES IN TRAFFIC: MODERATE CHANCE OF DOZING
HOW LIKELY ARE YOU TO NOD OFF OR FALL ASLEEP WHEN YOU ARE A PASSENGER IN A CAR FOR AN HOUR WITHOUT A BREAK: MODERATE CHANCE OF DOZING
HOW LIKELY ARE YOU TO NOD OFF OR FALL ASLEEP WHILE SITTING AND TALKING TO SOMEONE: SLIGHT CHANCE OF DOZING

## 2022-06-09 NOTE — PATIENT INSTRUCTIONS
1.  During regular workdays and days off, maintain consistent sleep schedule between 10 PM to 5-6:30 AM, to vary somewhat with when your baby wakes.     2.  Following a night of being on-call or working at night shift, go to bed directly after getting home and talk with your psychiatrist about not taking Ritalin on those mornings as this can prevent sleep at a time when your sleep drive is exceedingly high.    3.  In the mornings after night shift,  set your alarm for 3-4 hours of recovery sleep. You may also benefit from wearing sunglasses on your drive home.    4.  In the afternoon and evening after working a night shift, avoid inadvertent napping in the afternoon and evening up until your bedtime.    5.  For cosleeping and breast-feeding at night, I recommend that you get out of bed at about 9 PM when your baby has fallen asleep after evening feeding.  This is an opportunity for you to go to another room and relax and wind down for about an hour before going back to bed for your bedtime of about 10 PM or so.  This will increase sleep drive and may help for better consolidated sleep.
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
## 2022-06-21 ENCOUNTER — TRANSFERRED RECORDS (OUTPATIENT)
Dept: HEALTH INFORMATION MANAGEMENT | Facility: CLINIC | Age: 32
End: 2022-06-21

## 2022-06-23 ENCOUNTER — LAB (OUTPATIENT)
Dept: LAB | Facility: CLINIC | Age: 32
End: 2022-06-23
Attending: OBSTETRICS & GYNECOLOGY
Payer: COMMERCIAL

## 2022-06-23 ENCOUNTER — OFFICE VISIT (OUTPATIENT)
Dept: OBGYN | Facility: CLINIC | Age: 32
End: 2022-06-23
Attending: OBSTETRICS & GYNECOLOGY
Payer: COMMERCIAL

## 2022-06-23 VITALS
DIASTOLIC BLOOD PRESSURE: 83 MMHG | HEIGHT: 64 IN | HEART RATE: 92 BPM | SYSTOLIC BLOOD PRESSURE: 121 MMHG | WEIGHT: 128 LBS | BODY MASS INDEX: 21.85 KG/M2

## 2022-06-23 DIAGNOSIS — N91.2 LACTATIONAL AMENORRHEA: Primary | ICD-10-CM

## 2022-06-23 DIAGNOSIS — N91.2 AMENORRHEA: ICD-10-CM

## 2022-06-23 LAB
B-HCG SERPL-ACNC: <1 IU/L (ref 0–5)
MIS SERPL-MCNC: 4.75 NG/ML (ref 0.58–8.1)

## 2022-06-23 PROCEDURE — 99203 OFFICE O/P NEW LOW 30 MIN: CPT | Performed by: OBSTETRICS & GYNECOLOGY

## 2022-06-23 PROCEDURE — 83520 IMMUNOASSAY QUANT NOS NONAB: CPT

## 2022-06-23 PROCEDURE — G0463 HOSPITAL OUTPT CLINIC VISIT: HCPCS

## 2022-06-23 PROCEDURE — 84702 CHORIONIC GONADOTROPIN TEST: CPT

## 2022-06-23 PROCEDURE — 36415 COLL VENOUS BLD VENIPUNCTURE: CPT

## 2022-06-23 ASSESSMENT — PAIN SCALES - GENERAL: PAINLEVEL: NO PAIN (0)
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
## 2022-06-26 ENCOUNTER — HEALTH MAINTENANCE LETTER (OUTPATIENT)
Age: 32
End: 2022-06-26

## 2022-06-28 ENCOUNTER — THERAPY VISIT (OUTPATIENT)
Dept: PHYSICAL THERAPY | Facility: CLINIC | Age: 32
End: 2022-06-28
Payer: COMMERCIAL

## 2022-06-28 DIAGNOSIS — M99.05 SOMATIC DYSFUNCTION OF PELVIC REGION: Primary | ICD-10-CM

## 2022-06-28 DIAGNOSIS — R10.2 PELVIC PAIN IN FEMALE: ICD-10-CM

## 2022-06-28 PROCEDURE — 97112 NEUROMUSCULAR REEDUCATION: CPT | Mod: GP | Performed by: PHYSICAL THERAPIST

## 2022-06-28 PROCEDURE — 97530 THERAPEUTIC ACTIVITIES: CPT | Mod: GP | Performed by: PHYSICAL THERAPIST

## 2022-06-28 PROCEDURE — 97140 MANUAL THERAPY 1/> REGIONS: CPT | Mod: GP | Performed by: PHYSICAL THERAPIST

## 2022-06-30 ENCOUNTER — TRANSFERRED RECORDS (OUTPATIENT)
Dept: HEALTH INFORMATION MANAGEMENT | Facility: CLINIC | Age: 32
End: 2022-06-30

## 2022-07-01 ENCOUNTER — TRANSFERRED RECORDS (OUTPATIENT)
Dept: HEALTH INFORMATION MANAGEMENT | Facility: CLINIC | Age: 32
End: 2022-07-01

## 2022-07-01 RX ORDER — ONDANSETRON 2 MG/ML
4 INJECTION INTRAMUSCULAR; INTRAVENOUS EVERY 6 HOURS PRN
Status: CANCELLED | OUTPATIENT
Start: 2022-07-01

## 2022-07-01 RX ORDER — NALOXONE HYDROCHLORIDE 0.4 MG/ML
0.2 INJECTION, SOLUTION INTRAMUSCULAR; INTRAVENOUS; SUBCUTANEOUS
Status: CANCELLED | OUTPATIENT
Start: 2022-07-01

## 2022-07-01 RX ORDER — NALOXONE HYDROCHLORIDE 0.4 MG/ML
0.4 INJECTION, SOLUTION INTRAMUSCULAR; INTRAVENOUS; SUBCUTANEOUS
Status: CANCELLED | OUTPATIENT
Start: 2022-07-01

## 2022-07-01 RX ORDER — ONDANSETRON 4 MG/1
4 TABLET, ORALLY DISINTEGRATING ORAL EVERY 6 HOURS PRN
Status: CANCELLED | OUTPATIENT
Start: 2022-07-01

## 2022-07-01 RX ORDER — FLUMAZENIL 0.1 MG/ML
0.2 INJECTION, SOLUTION INTRAVENOUS
Status: CANCELLED | OUTPATIENT
Start: 2022-07-01 | End: 2022-07-02

## 2022-07-01 RX ORDER — LIDOCAINE 40 MG/G
CREAM TOPICAL
Status: CANCELLED | OUTPATIENT
Start: 2022-07-01

## 2022-07-01 NOTE — NURSING NOTE
Patient already had an initial appointment with Dr. Thomas.  My Chart message will be sent to pt.:  Brandan Frederick,    Just a quick note to follow-up on your visit with Gina Miller PA-C on 6/1/2022. She placed orders for a future order for ferritin level - will plan to get that checked in 2-3 months after starting iron supplement and then follow-up with her ~1 week after getting ferritin rechecked to discuss results/progress. Please call to schedule follow-up with Gina after labs at [phone].    Thank you!    [facility]  Fiona Ochoa CMA

## 2022-07-07 ENCOUNTER — ANESTHESIA EVENT (OUTPATIENT)
Dept: GASTROENTEROLOGY | Facility: CLINIC | Age: 32
End: 2022-07-07
Payer: COMMERCIAL

## 2022-07-07 ENCOUNTER — TRANSCRIBE ORDERS (OUTPATIENT)
Dept: OTHER | Age: 32
End: 2022-07-07

## 2022-07-07 ENCOUNTER — PATIENT OUTREACH (OUTPATIENT)
Dept: ONCOLOGY | Facility: CLINIC | Age: 32
End: 2022-07-07

## 2022-07-07 DIAGNOSIS — T14.8XXA BRUISING: Primary | ICD-10-CM

## 2022-07-07 NOTE — PROGRESS NOTES
Referral for severe bruising entered by Dr. Melendrez routed to Bleeding and Clotting for review. Referral also notes newly confirmed neuroendocrine tumor of sigmoid colon. Will enter Oncology referral to evaluate need for follow-up on neuroendocrine tumor.     Venecia Henry, PEPITON, RN, PHN, OCN  Hematology/Oncology Nurse Navigator  [facility]  [phone]

## 2022-07-08 ENCOUNTER — ANESTHESIA (OUTPATIENT)
Dept: GASTROENTEROLOGY | Facility: CLINIC | Age: 32
End: 2022-07-08
Payer: COMMERCIAL

## 2022-07-08 ENCOUNTER — HOSPITAL ENCOUNTER (OUTPATIENT)
Facility: CLINIC | Age: 32
Discharge: HOME | End: 2022-07-08
Attending: INTERNAL MEDICINE | Admitting: INTERNAL MEDICINE
Payer: COMMERCIAL

## 2022-07-08 ENCOUNTER — PRE VISIT (OUTPATIENT)
Dept: ONCOLOGY | Facility: CLINIC | Age: 32
End: 2022-07-08

## 2022-07-08 VITALS
DIASTOLIC BLOOD PRESSURE: 87 MMHG | HEART RATE: 66 BPM | OXYGEN SATURATION: 100 % | SYSTOLIC BLOOD PRESSURE: 110 MMHG | RESPIRATION RATE: 12 BRPM

## 2022-07-08 LAB
COLONOSCOPY: NORMAL
HCG UR QL: NEGATIVE
LOWER EUS: NORMAL

## 2022-07-08 PROCEDURE — 45385 COLONOSCOPY W/LESION REMOVAL: CPT | Performed by: INTERNAL MEDICINE

## 2022-07-08 PROCEDURE — 88342 IMHCHEM/IMCYTCHM 1ST ANTB: CPT | Mod: 26 | Performed by: PATHOLOGY

## 2022-07-08 PROCEDURE — 88341 IMHCHEM/IMCYTCHM EA ADD ANTB: CPT | Mod: 26 | Performed by: PATHOLOGY

## 2022-07-08 PROCEDURE — 250N000009 HC RX 250: Performed by: NURSE ANESTHETIST, CERTIFIED REGISTERED

## 2022-07-08 PROCEDURE — 370N000017 HC ANESTHESIA TECHNICAL FEE, PER MIN: Performed by: INTERNAL MEDICINE

## 2022-07-08 PROCEDURE — 258N000003 HC RX IP 258 OP 636: Performed by: NURSE ANESTHETIST, CERTIFIED REGISTERED

## 2022-07-08 PROCEDURE — 250N000011 HC RX IP 250 OP 636: Performed by: NURSE ANESTHETIST, CERTIFIED REGISTERED

## 2022-07-08 PROCEDURE — 81025 URINE PREGNANCY TEST: CPT | Performed by: ANESTHESIOLOGY

## 2022-07-08 PROCEDURE — 88305 TISSUE EXAM BY PATHOLOGIST: CPT | Mod: TC | Performed by: INTERNAL MEDICINE

## 2022-07-08 PROCEDURE — 45391 COLONOSCOPY W/ENDOSCOPE US: CPT | Performed by: INTERNAL MEDICINE

## 2022-07-08 PROCEDURE — 45381 COLONOSCOPY SUBMUCOUS NJX: CPT | Performed by: INTERNAL MEDICINE

## 2022-07-08 PROCEDURE — 43237 ENDOSCOPIC US EXAM ESOPH: CPT | Performed by: INTERNAL MEDICINE

## 2022-07-08 PROCEDURE — 45380 COLONOSCOPY AND BIOPSY: CPT | Performed by: INTERNAL MEDICINE

## 2022-07-08 PROCEDURE — 88305 TISSUE EXAM BY PATHOLOGIST: CPT | Mod: 26 | Performed by: PATHOLOGY

## 2022-07-08 PROCEDURE — 999N000010 HC STATISTIC ANES STAT CODE-CRNA PER MINUTE: Performed by: INTERNAL MEDICINE

## 2022-07-08 RX ORDER — SODIUM FLUORIDE 5 MG/G
1 PASTE, DENTIFRICE ORAL AT BEDTIME
COMMUNITY

## 2022-07-08 RX ORDER — SODIUM CHLORIDE, SODIUM LACTATE, POTASSIUM CHLORIDE, CALCIUM CHLORIDE 600; 310; 30; 20 MG/100ML; MG/100ML; MG/100ML; MG/100ML
INJECTION, SOLUTION INTRAVENOUS CONTINUOUS PRN
Status: DISCONTINUED | OUTPATIENT
Start: 2022-07-08 | End: 2022-07-08

## 2022-07-08 RX ORDER — LIDOCAINE HYDROCHLORIDE 20 MG/ML
INJECTION, SOLUTION INFILTRATION; PERINEURAL PRN
Status: DISCONTINUED | OUTPATIENT
Start: 2022-07-08 | End: 2022-07-08

## 2022-07-08 RX ORDER — PROPOFOL 10 MG/ML
INJECTION, EMULSION INTRAVENOUS PRN
Status: DISCONTINUED | OUTPATIENT
Start: 2022-07-08 | End: 2022-07-08

## 2022-07-08 RX ORDER — ONDANSETRON 2 MG/ML
INJECTION INTRAMUSCULAR; INTRAVENOUS PRN
Status: DISCONTINUED | OUTPATIENT
Start: 2022-07-08 | End: 2022-07-08

## 2022-07-08 RX ORDER — PROPOFOL 10 MG/ML
INJECTION, EMULSION INTRAVENOUS CONTINUOUS PRN
Status: DISCONTINUED | OUTPATIENT
Start: 2022-07-08 | End: 2022-07-08

## 2022-07-08 RX ADMIN — LIDOCAINE HYDROCHLORIDE 100 MG: 20 INJECTION, SOLUTION INFILTRATION; PERINEURAL at 13:26

## 2022-07-08 RX ADMIN — SODIUM CHLORIDE, POTASSIUM CHLORIDE, SODIUM LACTATE AND CALCIUM CHLORIDE: 600; 310; 30; 20 INJECTION, SOLUTION INTRAVENOUS at 13:24

## 2022-07-08 RX ADMIN — PROPOFOL 100 MCG/KG/MIN: 10 INJECTION, EMULSION INTRAVENOUS at 13:29

## 2022-07-08 RX ADMIN — ONDANSETRON 4 MG: 2 INJECTION INTRAMUSCULAR; INTRAVENOUS at 13:26

## 2022-07-08 RX ADMIN — PROPOFOL 30 MG: 10 INJECTION, EMULSION INTRAVENOUS at 13:26

## 2022-07-08 RX ADMIN — PROPOFOL 20 MG: 10 INJECTION, EMULSION INTRAVENOUS at 13:30

## 2022-07-08 ASSESSMENT — COPD QUESTIONNAIRES: COPD: 0

## 2022-07-08 ASSESSMENT — LIFESTYLE VARIABLES: TOBACCO_USE: 0
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
## 2022-07-08 NOTE — OR NURSING
Pt stated she felt like she was going to faint when sitting. Return to bed and IV continued and monitored.

## 2022-07-08 NOTE — ANESTHESIA POSTPROCEDURE EVALUATION
Patient: Yoly Adams    Procedure: Procedure(s):  ENDOSCOPIC ULTRASOUND, LOWER TRACT  COLONOSCOPY, FLEXIBLE, WITH LESION REMOVAL USING SNARE  COLONOSCOPY, WITH POLYPECTOMY AND BIOPSY  TATTOOING, WITH COLONOSCOPY       Anesthesia Type:  MAC    Note:  Disposition: Outpatient   Postop Pain Control: Uneventful            Sign Out: Well controlled pain   PONV: No   Neuro/Psych: Uneventful            Sign Out: Acceptable/Baseline neuro status   Airway/Respiratory: Uneventful            Sign Out: Acceptable/Baseline resp. status   CV/Hemodynamics: Uneventful            Sign Out: Acceptable CV status   Other NRE: NONE   DID A NON-ROUTINE EVENT OCCUR? No           Last vitals:  Vitals Value Taken Time   /69 07/08/22 1450   Temp     Pulse 64 07/08/22 1457   Resp 34 07/08/22 1456   SpO2 100 % 07/08/22 1456   Vitals shown include unvalidated device data.    Electronically Signed By: Lion Helm MD  July 8, 2022  2:58 PM

## 2022-07-08 NOTE — ANESTHESIA CARE TRANSFER NOTE
Patient: Yoly Adams    Procedure: Procedure(s):  ENDOSCOPIC ULTRASOUND, LOWER TRACT  COLONOSCOPY, FLEXIBLE, WITH LESION REMOVAL USING SNARE  COLONOSCOPY, WITH POLYPECTOMY AND BIOPSY  TATTOOING, WITH COLONOSCOPY       Diagnosis: Neuroendocrine tumor [D3A.8]  Diagnosis Additional Information: No value filed.    Anesthesia Type:   MAC     Note:    Oropharynx: spontaneously breathing and oropharynx clear of all foreign objects    Oxygen Supplementation: nasal cannula  Level of Supplemental Oxygen (L/min / FiO2): 4 LNC  Independent Airway: airway patency satisfactory and stable  Dentition: dentition unchanged  Vital Signs Stable: post-procedure vital signs reviewed and stable  Report to RN Given: handoff report given  Patient transferred to: PACU    Handoff Report: Identifed the Patient, Identified the Reponsible Provider, Reviewed the pertinent medical history, Discussed the surgical course, Reviewed Intra-OP anesthesia mangement and issues during anesthesia, Set expectations for post-procedure period and Allowed opportunity for questions and acknowledgement of understanding      Vitals:  Vitals Value Taken Time   BP     Temp     Pulse     Resp     SpO2 100 % 07/08/22 1420   Vitals shown include unvalidated device data.    Electronically Signed By: CESILIA Bustos CRNA  July 8, 2022  2:21 PM

## 2022-07-08 NOTE — TELEPHONE ENCOUNTER
Action July 13, 2022 4:08 PM ABT   Action Taken Records from [facility] received and sent to [facility] for upload     Action July 11, 2022 1:09 PM ABT   Action Taken Called and spoke to patient, she states that she had images done  With [facility] for MR Abd.    Patient gave verbal auth to update    4:34 PM  Image from [facility] received and resolved to PACS. Image report from [facility] received and sent to HIM for upload     Action July 8, 2022 3:18 PM ABT   Action Taken Called and spoke to Nuris @ [facility], she states that patient as a few appt set up but they were cancelled. Nuris then states that she has upcoming appt with their provider in Aug 2022.    Patient is currently still in procedure, will reach out to patient at later time.     RECORDS STATUS - ALL OTHER DIAGNOSIS      [facility]   DATE RECEIVED: 08/01/22   NOTES STATUS DETAILS   OFFICE NOTE from referring provider External: [facility] 07/01/22: Dr. Zainab Melendrez   DISCHARGE SUMMARY from hospital [facility] 07/08/22: [facility]   OPERATIVE REPORT External: [facility]  Epic 06/21/22: EGD & Coloncosopy    07/08/22: ENDOSCOPIC ULTRASOUND, LOWER TRACT   MEDICATION LIST External: [facility]    LABS     PATHOLOGY REPORTS Report in EPIC  07/08/22: [id number]   ANYTHING RELATED TO DIAGNOSIS Epic Most recent 07/13/22   IMAGING (NEED IMAGES & REPORT)     MRI PACS 06/30/22: MR Enterography   ULTRASOUND PACS 02/21/22: US Pelvic

## 2022-07-12 LAB
PATH REPORT.COMMENTS IMP SPEC: NORMAL
PATH REPORT.COMMENTS IMP SPEC: NORMAL
PATH REPORT.FINAL DX SPEC: NORMAL
PATH REPORT.GROSS SPEC: NORMAL
PATH REPORT.MICROSCOPIC SPEC OTHER STN: NORMAL
PATH REPORT.RELEVANT HX SPEC: NORMAL
PHOTO IMAGE: NORMAL

## 2022-07-13 ENCOUNTER — OFFICE VISIT (OUTPATIENT)
Dept: HEMATOLOGY | Facility: CLINIC | Age: 32
End: 2022-07-13
Attending: INTERNAL MEDICINE
Payer: COMMERCIAL

## 2022-07-13 VITALS
HEIGHT: 64 IN | DIASTOLIC BLOOD PRESSURE: 77 MMHG | BODY MASS INDEX: 21.97 KG/M2 | RESPIRATION RATE: 16 BRPM | HEART RATE: 82 BPM | SYSTOLIC BLOOD PRESSURE: 116 MMHG | OXYGEN SATURATION: 99 % | TEMPERATURE: 97.8 F

## 2022-07-13 DIAGNOSIS — R23.3 EASY BRUISABILITY: Primary | ICD-10-CM

## 2022-07-13 LAB
APTT PPP: 28 SECONDS (ref 22–38)
FIBRINOGEN PPP-MCNC: 260 MG/DL (ref 170–490)
INR PPP: 0.96 (ref 0.85–1.15)

## 2022-07-13 PROCEDURE — 85610 PROTHROMBIN TIME: CPT | Performed by: INTERNAL MEDICINE

## 2022-07-13 PROCEDURE — 99204 OFFICE O/P NEW MOD 45 MIN: CPT | Performed by: INTERNAL MEDICINE

## 2022-07-13 PROCEDURE — 85730 THROMBOPLASTIN TIME PARTIAL: CPT | Performed by: INTERNAL MEDICINE

## 2022-07-13 PROCEDURE — 85290 CLOT FACTOR XIII FIBRIN STAB: CPT | Performed by: INTERNAL MEDICINE

## 2022-07-13 PROCEDURE — G0463 HOSPITAL OUTPT CLINIC VISIT: HCPCS

## 2022-07-13 PROCEDURE — 36415 COLL VENOUS BLD VENIPUNCTURE: CPT | Performed by: INTERNAL MEDICINE

## 2022-07-13 PROCEDURE — 85384 FIBRINOGEN ACTIVITY: CPT | Performed by: INTERNAL MEDICINE

## 2022-07-13 RX ORDER — FAMOTIDINE 40 MG/1
40 TABLET, FILM COATED ORAL 2 TIMES DAILY
COMMUNITY
Start: 2022-03-04 | End: 2022-07-13

## 2022-07-13 ASSESSMENT — PAIN SCALES - GENERAL: PAINLEVEL: MODERATE PAIN (5)

## 2022-07-13 NOTE — PROGRESS NOTES
[facility]  [address] [place] [id number]  Phone: [phone], Fax: [phone]    Outpatient Visit Note:    Patient: Yoly Adams  MRN: [id number]  : 1990  KHALIDA: 2022     Reason for Consultation:  Yoly Adams is a referred by Dr Zainab Melendrez for evaluation and treatment of easy bruising.    Assessment: Yoly Adams is a 32 year old woman with EDS and recent episodes of unexplained ecchymoses in a pattern suggestive of possible multifactorial ecchymoses related to EDS and severe pruritus (trauma induced ecchymosis) or also possibly IgA vasculitis given its distribution located in posterior part of both legs.  We will screen her for coagulation factor deficiencies today but my suspicion currently is quite low for inherited or acquired factor deficiency.  I her for reassurance that this is likely benign but if she has further episodes of these confluent ecchymoses, then I would consider testing for IgA vasculitis but UA inflammatory markers.    Recommendations:  1. I counseled the patient about my assessment of potential causes for bleeding symptoms.  We reviewed testing available for assessment of the coagulation system.  I explained how EDS can also cause easy bruising and surgical bleeding.  2. Today we will do routine coagulation screening, fibrinogen and factor XIII levels.  Assuming these are normal then I think the neck step would be to consider testing for IgA vasculitis if she has recurrence of these more severe symptoms.  3. We also briefly discussed pregnancy management with LAXMI.  I explained that given the lack of bleeding with prior pregnancies her risk for serious bleeding complications is low.  She could safely use tranexamic acid 1000 mg prior to .  She could also continue this for the immediate postoperative period for 3 to 7 days.  If she has bleeding during surgery, DDAVP has been used in the past for EDS  patients and this could be considered but again only if unexpected bleeding occurs at the time of delivery.    50 minutes spent on the date of the encounter doing chart review, review of test results, interpretation of tests, patient visit and documentation     Ralph Harrison MD   of Medicine, Division of Hematology, Oncology and Transplantation  [facility]     Lab addendum:  INR,aPTT, fibrinogen are all normal.  F13 is pending    -------------------------------  History: Yoly Adams is a 32 year old woman with a history of EDS including multiple manifestations collagen defect from visual changes to pregnancy complications who presents for patient of new bleeding symptoms.  She reports that she has insidious onset of symptoms but has pictures of confluent large ecchymoses (no hematomas) that occurred on the back of her legs in the recent past.  This is now resolved.  She does occasionally still have bruises that cannot be explained.  She notes no nosebleeds or gingival bleeding.  She does occasionally have bleeding when she brushes her teeth but this is related to known gum disease suspected to be related from EDS including major gingival grafting and receding of gingiva in the past.  She has had no menstrual cycle since delivery of her second child.  She had no excessive bleeding with wisdom tooth extraction.    She is not aware of any bleeding disorders in the family.  Her mother suffers from severe epistaxis which is a fairly new complaint.  She is concerned this may be related to collagen defect in her mother.  Her father had severe bleeding with tonsillectomy.    Physical Exam:  Vitals: B/P: 116/77, T: 97.8, P: 82, R: 16, Wt: 0 lbs 0 oz Body mass index is 21.97 kg/m .   Exam:   Gen: Appears well, no distress  HEENT: no scleral icterus or hemorrhage, no wet purpura, no lymphadenopathy  Skin: no ecchymoses or hematomas  Neuro: no focal deficits, affect and  cognition are normal    Labs:  Her von Willebrand factor panel is normal as well as her CBC    Imaging:  None

## 2022-07-14 ENCOUNTER — MYC MEDICAL ADVICE (OUTPATIENT)
Dept: HEMATOLOGY | Facility: CLINIC | Age: 32
End: 2022-07-14

## 2022-07-14 ENCOUNTER — VIRTUAL VISIT (OUTPATIENT)
Dept: SLEEP MEDICINE | Facility: CLINIC | Age: 32
End: 2022-07-14
Payer: COMMERCIAL

## 2022-07-14 VITALS
DIASTOLIC BLOOD PRESSURE: 77 MMHG | BODY MASS INDEX: 21.34 KG/M2 | WEIGHT: 125 LBS | SYSTOLIC BLOOD PRESSURE: 116 MMHG | HEIGHT: 64 IN

## 2022-07-14 DIAGNOSIS — F51.04 CHRONIC INSOMNIA: Primary | ICD-10-CM

## 2022-07-14 PROCEDURE — 90832 PSYTX W PT 30 MINUTES: CPT | Mod: 95 | Performed by: PSYCHOLOGIST

## 2022-07-14 ASSESSMENT — SLEEP AND FATIGUE QUESTIONNAIRES
HOW LIKELY ARE YOU TO NOD OFF OR FALL ASLEEP WHILE SITTING QUIETLY AFTER LUNCH WITHOUT ALCOHOL: HIGH CHANCE OF DOZING
HOW LIKELY ARE YOU TO NOD OFF OR FALL ASLEEP IN A CAR, WHILE STOPPED FOR A FEW MINUTES IN TRAFFIC: SLIGHT CHANCE OF DOZING
HOW LIKELY ARE YOU TO NOD OFF OR FALL ASLEEP WHILE SITTING INACTIVE IN A PUBLIC PLACE: MODERATE CHANCE OF DOZING
HOW LIKELY ARE YOU TO NOD OFF OR FALL ASLEEP WHILE WATCHING TV: SLIGHT CHANCE OF DOZING
HOW LIKELY ARE YOU TO NOD OFF OR FALL ASLEEP WHILE SITTING AND TALKING TO SOMEONE: SLIGHT CHANCE OF DOZING
HOW LIKELY ARE YOU TO NOD OFF OR FALL ASLEEP WHEN YOU ARE A PASSENGER IN A CAR FOR AN HOUR WITHOUT A BREAK: HIGH CHANCE OF DOZING
HOW LIKELY ARE YOU TO NOD OFF OR FALL ASLEEP WHILE LYING DOWN TO REST IN THE AFTERNOON WHEN CIRCUMSTANCES PERMIT: HIGH CHANCE OF DOZING
HOW LIKELY ARE YOU TO NOD OFF OR FALL ASLEEP WHILE SITTING AND READING: HIGH CHANCE OF DOZING

## 2022-07-14 NOTE — NURSING NOTE
Flu vaccine taken at [facility] in Feb 2022. Pt stated there were no changes to her allergies and medications since her 7/13/22 visit.

## 2022-07-14 NOTE — PROGRESS NOTES
Yoly is a 32 year old who is being evaluated via a billable video visit.      How would you like to obtain your AVS? MyChart  If the video visit is dropped, the invitation should be resent by: Text to cell phone: [phone]  Will anyone else be joining your video visit? No      Video-Visit Details    Video Start Time: 2:12 PM    Type of service:  Video Visit    Video End Time:2:30 PM    Originating Location (pt. Location): Home    Distant Location (provider location):  [facility]     Platform used for Video Visit: [employer]    SLEEP MEDICINE VIRTUAL VIDEO FOLLOW-UP VISIT  Sleep Psychology    Patient Name: Yoly Adams  MRN:  [id number]  Date of Service: Jul 14, 2022       Subjective Report     Yoly Adams  returns for a telehealth video visit to discuss progress in implementing behavioral strategies for the management of insomnia.  Patient consent for initiation of video visit was obtained and documented prior to initiation of visit.     Yoly reports she was diagnosed with an cancerous neuroendocrine tumor tumor.  She had successful resection surgery and will feeling understandable health anxiety, she is hopeful for complete remission.    The focus of this follow-up was on managing sleep in the setting of recovering from recent medical interventions and activity.  Focus was on acceptance and commitment strategies allowing for permissive 7-8-hour sleep schedule while maintaining a consistent sleep window and using short naps to manage any tiredness or fatigue.    We did discuss the effect of anxiety and stress on sleep continuity.  Patient appears to be having typical stress reaction affecting middle and early morning sleep pattern.     .     Sleep Data:     Source of Sleep Estimates:  Verbal Self-report    Reduction in sleep efficiency with increase in wake after sleep onset to about 60-90 minutes.  She has been benefiting from stimulus control  "in which overall sleep efficiency had increased over 85%.  EPWORTH SLEEPINESS SCALE    Sitting and reading 3   Watching TV 1   Sitting, inactive in a public place (theatre or mtg.) 2    As a passenger in a car 3   Lying down to rest in the afternoon when circumstance permit 3   Sitting and talking to someone 1   Sitting quietly after lunch without alcohol 3   In a car, while stopped for a few minutes in traffic 1   TOTAL SCORE (nl <11) 17     INSOMNIA SEVERITY INDEX     Difficulty falling asleep 1   Difficult staying asleep 3   Problems waking up to early 3   How SATISFIED/DISSATISFIED are you with your CURRENT sleep pattern? 3   How NOTICEABLE to others do you think your sleep pattern is in terms of your quality of life? 3   How WORRIED/DISTRESSED are you about your current sleep pattern? 1   To what extent do you consider your sleep problem to INTERFERE with your daily fuctioning(e.g. daytime fatigue, mood, ability to function at work/daily chores, concentration, mood,etc.) CURRENTLY? 3   INSOMNIA SEVERITY INDEX TOTAL SCORE 17    Absence of insomnia (0-7); sub-threshold insomnia (8-14); moderate insomnia (15-21); and severe insomnia (22-28)       Interventions     Strategies and recommendations including ACT strategies for insomnia, Anxiety and sleep and Sleep apnea treatment were discussed today.       Vital Signs     /77   Ht 1.626 m (5' 4\")   Wt 56.7 kg (125 lb)   BMI 21.46 kg/m       Mental Status     Orientation:  X3  Mood:  normal  Affect:  Congruent with mood  Speech/Language:  Normal  Thought Process: Intact  Associations:  Normal  Thought Content: Normal  Patient does not report any suicidal ideation, intention or plan.    Diagnostic Impressions and Plan     Chronic insomnia    Yoly Adams reports an increase in insomnia symptoms due to acute stress related to medical factors associated with diagnoses and resection of the neuroendocrine tumor.  She is experiencing typical increase in middle " and early morning sleep fragmentation. window.  Prior to this she has benefited from stimulus control therapy.    Plan:  Continue with consistent 7-8-hour sleep window and consistent wake time.  Augment as needed with a brief nap in the early afternoon to manage tiredness and fatigue.  At a point where she feels she has resolved situational stress and is feeling better, resume more vigorous stimulus control.    Follow-up: 6 weeks      Murray Thomas PsyD, MATHEUS, DBSM  Diplomate, Behavioral Sleep Medicine  [facility]      Note: This dictation was created using voice recognition software. This document may contain an error not identified before finalizing the document. If the error changes the accuracy of the document, I would appreciate it being brought to my attention.

## 2022-07-15 LAB — FACT XIII AG ACT/NOR PPP IA: 119 % (ref 75–155)

## 2022-07-22 NOTE — PROGRESS NOTES
[facility]    Outpatient Physical Therapy Discharge Note  Patient: Yoly Adams  : 1990    Beginning/End Dates of Reporting Period:  21 to 3/30/22 (8 visits)    Referring Provider: Zainab Melendrez ([facility])    Therapy Diagnosis: Deconditioning in context of diffuse joint hypermobility and chronic pain.     Client Self Report: Got imaging results: Mild L C1-2 rotation hypermobility. MRI flex/ext: Lujan meeasurement 12.1mm. clivo-acila angle 131 degrees. Hypermobility C2-3, C4-5. Disc bulge contacts cord all positions at C4-5 and T2-3. C5-6 central cord narrowing with ext only C6-7. MRI lumbar spine: normal conus medullaris and filum terminale. L4-5 central 3mm disc protrusion without impingement. Did have an appt with Dr Melendrez scheduled this week.    Goals:  Goal Identifier Shoulder reaching   Goal Description Pt report improved B shoulder stability with reaching overhead for activities such as washing hair, reaching for objects in cabinets via avg shoulder pain <2.5/10.   Target Date 22   Date Met      Progress (detail required for progress note):       Goal Identifier Neck pain   Goal Description Pt report decreased average neck pain to <3.3/10 as result of improved strength and stability, improved posture and ergonomics/compensation strategies.   Target Date 22   Date Met   NOT MET   Progress (detail required for progress note):  Neck pain 5/10     Goal Identifier Standing tolerance   Goal Description Pt report use of at least 3 strategies for managing her dizziness that is experienced with positional changes and prolonged standing.   Target Date 22   Date Met   NOT MET   Progress (detail required for progress note):  Not specifically addressed in last few sessions due to other priorities.     Goal Identifier Back pain   Goal Description Pt report decreased back pain  to <5/10 as result of improved strength and stability, use of compensation strategies/devices, as well as improved body mechanics throughout her day.   Target Date 03/06/22   Date Met   2/23/22   Progress (detail required for progress note):  Back pain 4/10     Goal Identifier HEP   Goal Description Pt report indep with HEP to be completed at least 5 days per week for strength, stability, balance and endurance training for ongoing wellness in context of EDS.   Target Date 03/06/22   Date Met   3/30/22   Progress (detail required for progress note):   Pt was consistent with whole body strength HEP.     Goal Identifier Sleep   Goal Description Pt report improved sleep with less stiffness/pain in AM as result of use of new positioning supports/postures.   Target Date 03/06/22   Date Met   NOT MET   Progress (detail required for progress note):   Not specifically addressed in last few sessions due to other priorities.     Goal Identifier FGA    Goal Description Pt to improve score on FGA from 19 to 25 or greater in order to demo a significant improvement in dynamic balance in order to decrease risk for falling and improve mobility    Target Date 03/06/22   Date Met   NOT MET   Progress (detail required for progress note):   Was not retested       Plan:  Discharge from therapy.    Discharge:    Reason for Discharge: Patient chooses to discontinue therapy.  Pt with very limited PT benefit of 15 visits per year for 2022. Had other priorities of potential procedures given new imaging results - decided to save visits for this.    Discharge Plan: Patient to continue home program.

## 2022-07-25 ENCOUNTER — TELEPHONE (OUTPATIENT)
Dept: OTOLARYNGOLOGY | Facility: CLINIC | Age: 32
End: 2022-07-25

## 2022-07-25 NOTE — TELEPHONE ENCOUNTER
Called patient regarding rescheduling surgery with Dr. Schmidt due to provider being out. Patient also states that  lost his job last week and they are not in the place financially to move forward at this time and would like to postpone. Patient is aware that she can call when she is ready to proceed.     Orders cancelled.     Nuris Washington on 7/25/2022 at 10:39 AM

## 2022-08-15 ENCOUNTER — PRE VISIT (OUTPATIENT)
Dept: ONCOLOGY | Facility: CLINIC | Age: 32
End: 2022-08-15

## 2022-08-15 ENCOUNTER — ONCOLOGY VISIT (OUTPATIENT)
Dept: ONCOLOGY | Facility: CLINIC | Age: 32
End: 2022-08-15
Attending: INTERNAL MEDICINE
Payer: COMMERCIAL

## 2022-08-15 VITALS
TEMPERATURE: 98.8 F | HEART RATE: 74 BPM | HEIGHT: 64 IN | WEIGHT: 126.4 LBS | OXYGEN SATURATION: 100 % | BODY MASS INDEX: 21.58 KG/M2 | SYSTOLIC BLOOD PRESSURE: 129 MMHG | DIASTOLIC BLOOD PRESSURE: 86 MMHG

## 2022-08-15 DIAGNOSIS — T14.8XXA BRUISING: ICD-10-CM

## 2022-08-15 DIAGNOSIS — C7A.8 PRIMARY MALIGNANT NEUROENDOCRINE NEOPLASM OF RECTUM (H): Primary | ICD-10-CM

## 2022-08-15 PROCEDURE — G0463 HOSPITAL OUTPT CLINIC VISIT: HCPCS

## 2022-08-15 PROCEDURE — 99204 OFFICE O/P NEW MOD 45 MIN: CPT | Performed by: INTERNAL MEDICINE

## 2022-08-15 ASSESSMENT — PAIN SCALES - GENERAL: PAINLEVEL: MILD PAIN (3)

## 2022-08-15 NOTE — NURSING NOTE
"Oncology Rooming Note    August 15, 2022 3:19 PM   Yoly Adams is a 32 year old female who presents for:    Chief Complaint   Patient presents with     Oncology Clinic Visit     bruising     Initial Vitals: /86   Pulse 74   Temp 98.8  F (37.1  C) (Oral)   Ht 1.635 m (5' 4.37\")   Wt 57.3 kg (126 lb 6.4 oz)   SpO2 100%   BMI 21.45 kg/m   Estimated body mass index is 21.45 kg/m  as calculated from the following:    Height as of this encounter: 1.635 m (5' 4.37\").    Weight as of this encounter: 57.3 kg (126 lb 6.4 oz). Body surface area is 1.61 meters squared.  Mild Pain (3) Comment: Data Unavailable   No LMP recorded. (Menstrual status: UNKNOWN).  Allergies reviewed: Yes  Medications reviewed: Yes    Medications: Medication refills not needed today.  Pharmacy name entered into [facility]:    [id number] - [place], [place] - [address] [id number] - [place], [place] - [address] AT [facility] [address]  [facility] - [place], [place] - [address]    Clinical concerns: none       Nuris Winters"

## 2022-08-15 NOTE — LETTER
8/15/2022         RE: Yoly Adams  [address] [place] [address]        Dear Colleague,    Thank you for referring your patient, Yoly Adams, to the [facility]. Please see a copy of my visit note below.    Yoly Adams is a new patient to me referred for evaluation of a recently resected rectal carcinoid.    Ms. Adams is 32 years old and has a long history of constipation and more recently some other nonspecific GI symptoms that led to a colonoscopy a couple months ago.  On that exam she had a 4 mm rectal polyp resected which turned out to be a neuroendocrine carcinoma.  She recently had a follow-up colonoscopy and endorectal ultrasound without any evidence of disease.  She has no history of flushing, wheezing or diarrhea.  She has problems with pelvic floor dysfunction, dyspareunia, easy bruising and has been diagnosed with hypermobile Balta-Danlos syndrome.  She has no personal or family history of endocrine disorders or kidney stones.    Her past medical history is notable only for the items mentioned above.  She has been given a diagnosis of mast cell activation syndrome.    She is  and has 1 young child.  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  She is a casual consumer of alcohol on a non-smoker.    On physical exam she appears robustly healthy.    I personally reviewed her recent pelvic MR scan which shows no clear evidence of disease.  And spent some time going over the images directly with her.    I reviewed the reports of her pathology from her resected tumor.  That shows a well differentiated neuroendocrine carcinoma with 0 mitotic figures and Ki-67 index of 1%.  On her original specimen review she was felt to have negative margins, but on review of her specimen at the [facility] there were noted to be a few tumor cells at the margin.  Reviewed a variety of lab results over the past year which  show normal electrolytes and renal function, normal bilirubin and liver enzymes, normal chromogranin A, normal iron studies, normal urine and blood histamine, prostaglandin; and blood counts.    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  At present the patient has no evidence of disease and her risk of recurrence is quite low.  Given the concerns about a positive margin and the inability on her last colonoscopy to be clear on from where the tumor had been resected, I think it would not be unreasonable to follow-up with sigmoidoscopy sometime later this year to be as clear as possible if there is no residual tumor.  I do not see any clear evidence of disease on her MR scan, but we discussed whether high resolution rectal protocol MR scan might be more sensitive and she wanted to go ahead and do that.  I recommended we wait a few months to do that to be sure any residual inflammation from her prior procedures is resolved.  She has had discussions with others about getting a dotatate PET scan, but I explained that it is not sensitive for small volume disease, if we do not see anything on the MRI that she has had would not find that of much value.  Her risk of having systemic disease is so low that any abnormality we might find on a PET is most likely to be a false positive.  I do not think she needs any further biochemical work-up unless we were to find something suspicious on her imaging or she develops new symptoms.  I reassured her that the probability of her harboring metastatic disease and recurrentce in the future is extremely small and (other than those last few test mentioned above to be as clear as possible that we truly have resected everything now,) I do not think she needs routine follow-up for this.  I will follow-up with her by phone in a couple months we see the results of the pelvic MRI.      Again, thank you for allowing me to participate in the care  of your patient.        Sincerely,        Papo Holman MD

## 2022-08-20 PROBLEM — M62.89 PELVIC FLOOR DYSFUNCTION IN FEMALE: Status: ACTIVE | Noted: 2022-04-06

## 2022-08-20 PROBLEM — L70.9 ACNE: Status: RESOLVED | Noted: 2017-03-01 | Resolved: 2022-08-20

## 2022-08-20 PROBLEM — L50.3 DERMATOGRAPHIC URTICARIA: Status: ACTIVE | Noted: 2022-03-01

## 2022-08-20 PROBLEM — L30.9 ECZEMA: Status: ACTIVE | Noted: 2022-03-01

## 2022-08-20 PROBLEM — F90.9 ADHD (ATTENTION DEFICIT HYPERACTIVITY DISORDER): Status: ACTIVE | Noted: 2022-02-08

## 2022-08-20 PROBLEM — F43.10 PTSD (POST-TRAUMATIC STRESS DISORDER): Status: ACTIVE | Noted: 2018-01-01

## 2022-08-20 PROBLEM — F43.23 ADJUSTMENT DISORDER WITH MIXED ANXIETY AND DEPRESSED MOOD: Status: RESOLVED | Noted: 2017-04-20 | Resolved: 2022-08-20

## 2022-08-20 NOTE — PROGRESS NOTES
Yoly Adams is a new patient to me referred for evaluation of a recently resected rectal carcinoid.    Ms. Adams is 32 years old and has a long history of constipation and more recently some other nonspecific GI symptoms that led to a colonoscopy a couple months ago.  On that exam she had a 4 mm rectal polyp resected which turned out to be a neuroendocrine carcinoma.  She recently had a follow-up colonoscopy and endorectal ultrasound without any evidence of disease.  She has no history of flushing, wheezing or diarrhea.  She has problems with pelvic floor dysfunction, dyspareunia, easy bruising and has been diagnosed with hypermobile Balta-Danlos syndrome.  She has no personal or family history of endocrine disorders or kidney stones.    Her past medical history is notable only for the items mentioned above.  She has been given a diagnosis of mast cell activation syndrome.    She is  and has 1 young child.  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  She is a casual consumer of alcohol on a non-smoker.    On physical exam she appears robustly healthy.    I personally reviewed her recent pelvic MR scan which shows no clear evidence of disease.  And spent some time going over the images directly with her.    I reviewed the reports of her pathology from her resected tumor.  That shows a well differentiated neuroendocrine carcinoma with 0 mitotic figures and Ki-67 index of 1%.  On her original specimen review she was felt to have negative margins, but on review of her specimen at the [facility] there were noted to be a few tumor cells at the margin.  Reviewed a variety of lab results over the past year which show normal electrolytes and renal function, normal bilirubin and liver enzymes, normal chromogranin A, normal iron studies, normal urine and blood histamine, prostaglandin; and blood counts.    Assessment/plan: Stage I low-grade well-differentiated  neuroendocrine carcinoma of the rectum.  At present the patient has no evidence of disease and her risk of recurrence is quite low.  Given the concerns about a positive margin and the inability on her last colonoscopy to be clear on from where the tumor had been resected, I think it would not be unreasonable to follow-up with sigmoidoscopy sometime later this year to be as clear as possible if there is no residual tumor.  I do not see any clear evidence of disease on her MR scan, but we discussed whether high resolution rectal protocol MR scan might be more sensitive and she wanted to go ahead and do that.  I recommended we wait a few months to do that to be sure any residual inflammation from her prior procedures is resolved.  She has had discussions with others about getting a dotatate PET scan, but I explained that it is not sensitive for small volume disease, if we do not see anything on the MRI that she has had would not find that of much value.  Her risk of having systemic disease is so low that any abnormality we might find on a PET is most likely to be a false positive.  I do not think she needs any further biochemical work-up unless we were to find something suspicious on her imaging or she develops new symptoms.  I reassured her that the probability of her harboring metastatic disease and recurrentce in the future is extremely small and (other than those last few test mentioned above to be as clear as possible that we truly have resected everything now,) I do not think she needs routine follow-up for this.  I will follow-up with her by phone in a couple months we see the results of the pelvic MRI.

## 2022-09-01 ENCOUNTER — VIRTUAL VISIT (OUTPATIENT)
Dept: SLEEP MEDICINE | Facility: CLINIC | Age: 32
End: 2022-09-01
Payer: COMMERCIAL

## 2022-09-01 VITALS
HEIGHT: 64 IN | SYSTOLIC BLOOD PRESSURE: 129 MMHG | WEIGHT: 121 LBS | BODY MASS INDEX: 20.66 KG/M2 | DIASTOLIC BLOOD PRESSURE: 86 MMHG

## 2022-09-01 DIAGNOSIS — G25.81 RESTLESS LEGS SYNDROME (RLS): ICD-10-CM

## 2022-09-01 DIAGNOSIS — F51.04 CHRONIC INSOMNIA: Primary | ICD-10-CM

## 2022-09-01 DIAGNOSIS — G47.26 SHIFTING SLEEP-WORK SCHEDULE: ICD-10-CM

## 2022-09-01 PROCEDURE — 90832 PSYTX W PT 30 MINUTES: CPT | Mod: 95 | Performed by: PSYCHOLOGIST

## 2022-09-01 ASSESSMENT — SLEEP AND FATIGUE QUESTIONNAIRES
HOW LIKELY ARE YOU TO NOD OFF OR FALL ASLEEP WHILE SITTING AND TALKING TO SOMEONE: SLIGHT CHANCE OF DOZING
HOW LIKELY ARE YOU TO NOD OFF OR FALL ASLEEP WHILE SITTING QUIETLY AFTER LUNCH WITHOUT ALCOHOL: HIGH CHANCE OF DOZING
HOW LIKELY ARE YOU TO NOD OFF OR FALL ASLEEP WHILE WATCHING TV: MODERATE CHANCE OF DOZING
HOW LIKELY ARE YOU TO NOD OFF OR FALL ASLEEP WHILE SITTING AND READING: HIGH CHANCE OF DOZING
HOW LIKELY ARE YOU TO NOD OFF OR FALL ASLEEP WHILE SITTING INACTIVE IN A PUBLIC PLACE: MODERATE CHANCE OF DOZING
HOW LIKELY ARE YOU TO NOD OFF OR FALL ASLEEP WHEN YOU ARE A PASSENGER IN A CAR FOR AN HOUR WITHOUT A BREAK: HIGH CHANCE OF DOZING
HOW LIKELY ARE YOU TO NOD OFF OR FALL ASLEEP IN A CAR, WHILE STOPPED FOR A FEW MINUTES IN TRAFFIC: SLIGHT CHANCE OF DOZING
HOW LIKELY ARE YOU TO NOD OFF OR FALL ASLEEP WHILE LYING DOWN TO REST IN THE AFTERNOON WHEN CIRCUMSTANCES PERMIT: HIGH CHANCE OF DOZING

## 2022-09-01 NOTE — PROGRESS NOTES
Yoly is a 32 year old who is being evaluated via a billable video visit.      How would you like to obtain your AVS? MyChart  If the video visit is dropped, the invitation should be resent by: Text to cell phone: [phone]  Will anyone else be joining your video visit? No      Video-Visit Details    Video Start Time: 2:06 PM    Type of service:  Video Visit    Video End Time:2:28 PM    Originating Location (pt. Location): Home    Distant Location (provider location):  [facility]     Platform used for Video Visit: [employer]    SLEEP MEDICINE VIRTUAL VIDEO FOLLOW-UP VISIT  Sleep Psychology    Patient Name: Yoly Adams  MRN:  [id number]  Date of Service: Sep 1, 2022       Subjective Report     Yoly Adams  returns for a telehealth video visit to discuss progress in implementing behavioral strategies for the management of insomnia and shift work sleep disorder.  Patient consent for initiation of video visit was obtained and documented prior to initiation of visit.     Yoly reports she has been trying to go to bed a bit later and get up a bit earlier to reduce the total time in bed.  She reports this has helped her to fall asleep more easily and now without melatonin.    She notices continued wakefulness and wakes before alarm usually between 3:30-5 AM.  She now goes to sleep at 9 am.    She feels less tired and sleeping than she did before.    She reports moderate improvement overall.    She has not worked night shift over the last month.  She has also tried to use the LR more for relaxation rather than get into bed to respond to joint pain.        .     .     Sleep Data:     Source of Sleep Estimates:  Verbal Self-report      EPWORTH SLEEPINESS SCALE    Sitting and reading 3   Watching TV 2   Sitting, inactive in a public place (theatre or mtg.) 2    As a passenger in a car 3   Lying down to rest in the afternoon when circumstance permit 3  "  Sitting and talking to someone 1   Sitting quietly after lunch without alcohol 3   In a car, while stopped for a few minutes in traffic 1   TOTAL SCORE (nl <11) 18     INSOMNIA SEVERITY INDEX     Difficulty falling asleep 1   Difficult staying asleep 3   Problems waking up to early 2   How SATISFIED/DISSATISFIED are you with your CURRENT sleep pattern? 3   How NOTICEABLE to others do you think your sleep pattern is in terms of your quality of life? 2   How WORRIED/DISTRESSED are you about your current sleep pattern? 1   To what extent do you consider your sleep problem to INTERFERE with your daily fuctioning(e.g. daytime fatigue, mood, ability to function at work/daily chores, concentration, mood,etc.) CURRENTLY? 2   INSOMNIA SEVERITY INDEX TOTAL SCORE 14    Absence of insomnia (0-7); sub-threshold insomnia (8-14); moderate insomnia (15-21); and severe insomnia (22-28)       Interventions     Strategies and recommendations including Stimulus control therapy, Sleep hygiene training and Circadian rhythm disorder treatment and management were discussed today.       Vital Signs     /86   Ht 1.635 m (5' 4.37\")   Wt 54.9 kg (121 lb)   BMI 20.53 kg/m       Mental Status     Orientation:  X3  Mood:  normal  Affect:  Congruent with mood  Speech/Language:  Normal  Thought Process: Intact  Associations:  Normal  Thought Content: Normal  Patient does not report any suicidal ideation, intention or plan.    Diagnostic Impressions and Plan        Chronic insomnia  Restless legs syndrome (RLS)  Shifting sleep-work schedule     Insomnia severity reduced, sleep latency now normal.  TST has increased to minimum of 6.5 hours.      Plan:  Continue current sleep schedule and plan    Follow-up: 3 months      Murray Thomas PsyD, MATHEUS, [facility]  Diplomate, Behavioral Sleep Medicine  [facility]      Note: This dictation was created using voice recognition software. This document may contain an error not " identified before finalizing the document. If the error changes the accuracy of the document, I would appreciate it being brought to my attention.

## 2022-09-20 ENCOUNTER — THERAPY VISIT (OUTPATIENT)
Dept: PHYSICAL THERAPY | Facility: CLINIC | Age: 32
End: 2022-09-20
Payer: COMMERCIAL

## 2022-09-20 DIAGNOSIS — R10.2 PELVIC PAIN IN FEMALE: ICD-10-CM

## 2022-09-20 DIAGNOSIS — M99.05 SOMATIC DYSFUNCTION OF PELVIC REGION: Primary | ICD-10-CM

## 2022-09-20 PROCEDURE — 97530 THERAPEUTIC ACTIVITIES: CPT | Mod: GP | Performed by: PHYSICAL THERAPIST

## 2022-09-20 PROCEDURE — 97112 NEUROMUSCULAR REEDUCATION: CPT | Mod: GP | Performed by: PHYSICAL THERAPIST

## 2022-09-20 PROCEDURE — 97140 MANUAL THERAPY 1/> REGIONS: CPT | Mod: GP | Performed by: PHYSICAL THERAPIST

## 2022-10-05 ENCOUNTER — THERAPY VISIT (OUTPATIENT)
Dept: PHYSICAL THERAPY | Facility: CLINIC | Age: 32
End: 2022-10-05
Payer: COMMERCIAL

## 2022-10-05 DIAGNOSIS — M99.05 SOMATIC DYSFUNCTION OF PELVIC REGION: Primary | ICD-10-CM

## 2022-10-05 DIAGNOSIS — R10.2 PELVIC PAIN IN FEMALE: ICD-10-CM

## 2022-10-05 PROCEDURE — 97112 NEUROMUSCULAR REEDUCATION: CPT | Mod: GP | Performed by: PHYSICAL THERAPIST

## 2022-10-05 PROCEDURE — 97530 THERAPEUTIC ACTIVITIES: CPT | Mod: GP | Performed by: PHYSICAL THERAPIST

## 2022-10-05 PROCEDURE — 97140 MANUAL THERAPY 1/> REGIONS: CPT | Mod: GP | Performed by: PHYSICAL THERAPIST

## 2022-11-07 NOTE — PROGRESS NOTES
Discharge Note    Progress reporting period is from initial evaluation date (please see noted date below) to Oct 5, 2022.  Linked Episodes   Type: Episode: Status: Noted: Resolved: Last update: Updated by:   PHYSICAL THERAPY Pelvic Dysfunction-4/6/2022 Active 4/6/2022  10/5/2022  9:04 AM Marva Palmer PT      Comments:       Yoly failed to follow up and current status is unknown.  Please see information below for last relevant information on current status.  Patient seen for 6 visits.    SUBJECTIVE  Subjective changes noted by patient:  Patient reports she is still having pain with penetration 0-5/10 PL.  Breathing techniques have been helpful.  .  Current pain level is 0/10 (up to 5/10 with intercourse).     Previous pain level was  9/10.   Changes in function:  Yes (See Goal flowsheet attached for changes in current functional level)  Adverse reaction to treatment or activity: None    OBJECTIVE  Changes noted in objective findings: Internal mild-moderate tone with insertion; tenderness present. Ques needed to assist with full relxation specifically in sitting.     ASSESSMENT/PLAN  Diagnosis: Pelvic dysfunction; Pelvic pain   Updated problem list and treatment plan:     STG/LTGs have been met or progress has been made towards goals:  Yes, please see goal flowsheet for most current information  Assessment of Progress: current status is unknown.    Last current status: Pt is progressing as expected   Self Management Plans:  HEP  I have re-evaluated this patient and find that the nature, scope, duration and intensity of the therapy is appropriate for the medical condition of the patient.  Yoly continues to require the following intervention to meet STG and LTG's:  HEP.    Recommendations:  Discharge with current home program.  Patient to follow up with MD as needed.    Please refer to the daily flowsheet for treatment today, total treatment time and time spent performing 1:1 timed codes.

## 2022-12-13 ENCOUNTER — LAB REQUISITION (OUTPATIENT)
Dept: LAB | Facility: CLINIC | Age: 32
End: 2022-12-13

## 2022-12-13 DIAGNOSIS — Z13.220 ENCOUNTER FOR SCREENING FOR LIPOID DISORDERS: ICD-10-CM

## 2022-12-13 LAB
CHOLEST SERPL-MCNC: 183 MG/DL
HDLC SERPL-MCNC: 84 MG/DL
LDLC SERPL CALC-MCNC: 85 MG/DL
NONHDLC SERPL-MCNC: 99 MG/DL
TRIGL SERPL-MCNC: 69 MG/DL

## 2022-12-13 PROCEDURE — 80061 LIPID PANEL: CPT | Performed by: PHYSICIAN ASSISTANT

## 2022-12-22 ENCOUNTER — TRANSCRIBE ORDERS (OUTPATIENT)
Dept: OTHER | Age: 32
End: 2022-12-22

## 2022-12-22 DIAGNOSIS — Q79.60 EHLERS-DANLOS SYNDROME: Primary | ICD-10-CM

## 2022-12-22 DIAGNOSIS — M54.2 NECK PAIN: ICD-10-CM

## 2022-12-23 ENCOUNTER — TRANSFERRED RECORDS (OUTPATIENT)
Dept: HEALTH INFORMATION MANAGEMENT | Facility: CLINIC | Age: 32
End: 2022-12-23

## 2023-01-27 ENCOUNTER — MYC MEDICAL ADVICE (OUTPATIENT)
Dept: INTERNAL MEDICINE | Facility: CLINIC | Age: 33
End: 2023-01-27

## 2023-01-27 ENCOUNTER — VIRTUAL VISIT (OUTPATIENT)
Dept: INTERNAL MEDICINE | Facility: CLINIC | Age: 33
End: 2023-01-27
Payer: COMMERCIAL
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
## 2023-01-27 DIAGNOSIS — Q79.62 HYPERMOBILE EHLERS-DANLOS SYNDROME: ICD-10-CM

## 2023-01-27 DIAGNOSIS — M54.2 CERVICALGIA: ICD-10-CM

## 2023-01-27 DIAGNOSIS — R20.8 DYSESTHESIA OF MULTIPLE SITES: ICD-10-CM

## 2023-01-27 DIAGNOSIS — M62.830 SPASM OF THORACIC BACK MUSCLE: ICD-10-CM

## 2023-01-27 DIAGNOSIS — S13.4XXS WHIPLASH INJURY TO NECK, SEQUELA: Primary | ICD-10-CM

## 2023-01-27 PROCEDURE — 99214 OFFICE O/P EST MOD 30 MIN: CPT | Mod: 95 | Performed by: PEDIATRICS
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
## 2023-01-27 NOTE — Clinical Note
Please contact patient and schedule: -- images ordered in this encounter -- 30 min video with me in 1 month -- 30 min in-person, first available, no TRINITY.

## 2023-01-29 PROBLEM — M62.830 SPASM OF THORACIC BACK MUSCLE: Status: ACTIVE | Noted: 2023-01-29

## 2023-01-29 PROBLEM — R20.8 DYSESTHESIA OF MULTIPLE SITES: Status: ACTIVE | Noted: 2023-01-29

## 2023-01-29 PROBLEM — M54.2 CERVICALGIA: Status: ACTIVE | Noted: 2023-01-29
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
## 2023-01-30 ENCOUNTER — MYC MEDICAL ADVICE (OUTPATIENT)
Dept: INTERNAL MEDICINE | Facility: CLINIC | Age: 33
End: 2023-01-30

## 2023-02-01 ENCOUNTER — ANCILLARY PROCEDURE (OUTPATIENT)
Dept: GENERAL RADIOLOGY | Facility: CLINIC | Age: 33
End: 2023-02-01
Attending: PEDIATRICS
Payer: COMMERCIAL

## 2023-02-01 ENCOUNTER — THERAPY VISIT (OUTPATIENT)
Dept: OCCUPATIONAL THERAPY | Facility: CLINIC | Age: 33
End: 2023-02-01
Payer: COMMERCIAL

## 2023-02-01 ENCOUNTER — ANCILLARY PROCEDURE (OUTPATIENT)
Dept: ULTRASOUND IMAGING | Facility: CLINIC | Age: 33
End: 2023-02-01
Attending: PEDIATRICS
Payer: COMMERCIAL

## 2023-02-01 DIAGNOSIS — M79.642 PAIN IN BOTH HANDS: Primary | ICD-10-CM

## 2023-02-01 DIAGNOSIS — M25.521 RIGHT ELBOW PAIN: ICD-10-CM

## 2023-02-01 DIAGNOSIS — R20.2 PARESTHESIAS: ICD-10-CM

## 2023-02-01 DIAGNOSIS — R20.8 DYSESTHESIA OF MULTIPLE SITES: ICD-10-CM
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
## 2023-02-01 DIAGNOSIS — M79.641 PAIN IN BOTH HANDS: Primary | ICD-10-CM

## 2023-02-01 DIAGNOSIS — M62.830 SPASM OF THORACIC BACK MUSCLE: ICD-10-CM

## 2023-02-01 DIAGNOSIS — S13.4XXS WHIPLASH INJURY TO NECK, SEQUELA: ICD-10-CM

## 2023-02-01 PROBLEM — M25.531 RIGHT WRIST PAIN: Status: ACTIVE | Noted: 2021-11-26

## 2023-02-01 PROCEDURE — 93922 UPR/L XTREMITY ART 2 LEVELS: CPT | Performed by: RADIOLOGY

## 2023-02-01 PROCEDURE — 93970 EXTREMITY STUDY: CPT | Performed by: RADIOLOGY

## 2023-02-01 PROCEDURE — 72074 X-RAY EXAM THORAC SPINE4/>VW: CPT | Performed by: STUDENT IN AN ORGANIZED HEALTH CARE EDUCATION/TRAINING PROGRAM

## 2023-02-01 PROCEDURE — 97167 OT EVAL HIGH COMPLEX 60 MIN: CPT | Mod: GO | Performed by: OCCUPATIONAL THERAPIST

## 2023-02-01 PROCEDURE — 72052 X-RAY EXAM NECK SPINE 6/>VWS: CPT | Mod: GC | Performed by: RADIOLOGY
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
## 2023-02-09 ENCOUNTER — THERAPY VISIT (OUTPATIENT)
Dept: OCCUPATIONAL THERAPY | Facility: CLINIC | Age: 33
End: 2023-02-09
Payer: COMMERCIAL

## 2023-02-09 DIAGNOSIS — M79.642 PAIN IN BOTH HANDS: ICD-10-CM

## 2023-02-09 DIAGNOSIS — R20.2 PARESTHESIAS: ICD-10-CM

## 2023-02-09 DIAGNOSIS — M79.641 PAIN IN BOTH HANDS: ICD-10-CM

## 2023-02-09 DIAGNOSIS — M25.531 RIGHT WRIST PAIN: ICD-10-CM

## 2023-02-09 DIAGNOSIS — M25.521 RIGHT ELBOW PAIN: Primary | ICD-10-CM

## 2023-02-09 PROCEDURE — 97140 MANUAL THERAPY 1/> REGIONS: CPT | Mod: GO | Performed by: OCCUPATIONAL THERAPIST

## 2023-02-09 PROCEDURE — 97535 SELF CARE MNGMENT TRAINING: CPT | Mod: GO | Performed by: OCCUPATIONAL THERAPIST
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
## 2023-02-16 ENCOUNTER — HOSPITAL ENCOUNTER (OUTPATIENT)
Dept: PHYSICAL THERAPY | Facility: CLINIC | Age: 33
Setting detail: THERAPIES SERIES
Discharge: HOME | End: 2023-02-16
Attending: FAMILY MEDICINE
Payer: COMMERCIAL

## 2023-02-16 DIAGNOSIS — M62.830 SPASM OF THORACIC BACK MUSCLE: ICD-10-CM

## 2023-02-16 DIAGNOSIS — S13.4XXS WHIPLASH INJURY TO NECK, SEQUELA: ICD-10-CM
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
## 2023-02-16 DIAGNOSIS — M54.2 CERVICALGIA: ICD-10-CM

## 2023-02-16 DIAGNOSIS — Q79.62 HYPERMOBILE EHLERS-DANLOS SYNDROME: ICD-10-CM

## 2023-02-16 PROCEDURE — 97110 THERAPEUTIC EXERCISES: CPT | Mod: GP | Performed by: PHYSICAL THERAPIST

## 2023-02-16 PROCEDURE — 97162 PT EVAL MOD COMPLEX 30 MIN: CPT | Mod: GP | Performed by: PHYSICAL THERAPIST

## 2023-02-20 ENCOUNTER — THERAPY VISIT (OUTPATIENT)
Dept: OCCUPATIONAL THERAPY | Facility: CLINIC | Age: 33
End: 2023-02-20
Payer: COMMERCIAL

## 2023-02-20 DIAGNOSIS — M25.531 RIGHT WRIST PAIN: ICD-10-CM

## 2023-02-20 DIAGNOSIS — M79.641 PAIN IN BOTH HANDS: ICD-10-CM

## 2023-02-20 DIAGNOSIS — R20.2 PARESTHESIAS: ICD-10-CM

## 2023-02-20 DIAGNOSIS — M25.521 RIGHT ELBOW PAIN: Primary | ICD-10-CM

## 2023-02-20 DIAGNOSIS — M79.642 PAIN IN BOTH HANDS: ICD-10-CM

## 2023-02-20 PROCEDURE — 97535 SELF CARE MNGMENT TRAINING: CPT | Mod: GO | Performed by: OCCUPATIONAL THERAPIST

## 2023-02-20 PROCEDURE — 97140 MANUAL THERAPY 1/> REGIONS: CPT | Mod: GO | Performed by: OCCUPATIONAL THERAPIST
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
## 2023-02-22 ENCOUNTER — HOSPITAL ENCOUNTER (OUTPATIENT)
Dept: PHYSICAL THERAPY | Facility: CLINIC | Age: 33
Setting detail: THERAPIES SERIES
Discharge: HOME | End: 2023-02-22
Attending: FAMILY MEDICINE
Payer: COMMERCIAL

## 2023-02-22 PROCEDURE — 97112 NEUROMUSCULAR REEDUCATION: CPT | Mod: GP | Performed by: PHYSICAL THERAPIST

## 2023-02-26 ENCOUNTER — MYC MEDICAL ADVICE (OUTPATIENT)
Dept: INTERNAL MEDICINE | Facility: CLINIC | Age: 33
End: 2023-02-26

## 2023-02-26 DIAGNOSIS — G54.0 TOS (THORACIC OUTLET SYNDROME): ICD-10-CM

## 2023-02-26 DIAGNOSIS — R20.8 DYSESTHESIA OF MULTIPLE SITES: ICD-10-CM

## 2023-02-26 DIAGNOSIS — M54.2 CERVICALGIA: ICD-10-CM

## 2023-02-26 NOTE — ASSESSMENT & PLAN NOTE
"[facility]  SPINE FILMS were complicated.  -- You have \"mild degenerative changes\" in the middle of your cervical spine. Your thoracic spine looks good.  -- You are able to voluntarily bend some of your cervical vertebrae out of position. This could cause a problem if you do this frequently, e.g. when asleep or at work.  -- We can consider a c-spine MRI if you want, but it won't change our management plan.   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 The arthritis is likely causing only a small amount of your trouble.  "

## 2023-02-26 NOTE — ASSESSMENT & PLAN NOTE
My[facility]  THORACIC OUTLET SYNDROME. The ultrasound suggested you might have TOS on the right side, but not the left. We should talk about options, and to get a few questions answered. A video visit should be fine for this.

## 2023-02-26 NOTE — CONFIDENTIAL NOTE
Coordinators, please try to get her a video visit with me before the existing in-person appointment. OK to use any unscheduled Friday morning or TRINITY slot.

## 2023-03-06 ENCOUNTER — HOSPITAL ENCOUNTER (OUTPATIENT)
Dept: PHYSICAL THERAPY | Facility: CLINIC | Age: 33
Setting detail: THERAPIES SERIES
Discharge: HOME | End: 2023-03-06
Attending: FAMILY MEDICINE
Payer: COMMERCIAL

## 2023-03-06 PROCEDURE — 97110 THERAPEUTIC EXERCISES: CPT | Mod: GP | Performed by: PHYSICAL THERAPIST

## 2023-03-17 ENCOUNTER — VIRTUAL VISIT (OUTPATIENT)
Dept: INTERNAL MEDICINE | Facility: CLINIC | Age: 33
End: 2023-03-17
Payer: COMMERCIAL

## 2023-03-17 DIAGNOSIS — G54.0 TOS (THORACIC OUTLET SYNDROME): ICD-10-CM

## 2023-03-17 DIAGNOSIS — M79.642 PAIN IN BOTH HANDS: ICD-10-CM

## 2023-03-17 DIAGNOSIS — K59.09 CHRONIC CONSTIPATION: ICD-10-CM

## 2023-03-17 DIAGNOSIS — M62.830 SPASM OF THORACIC BACK MUSCLE: ICD-10-CM

## 2023-03-17 DIAGNOSIS — G90.9 AUTONOMIC DYSFUNCTION: Primary | ICD-10-CM

## 2023-03-17 DIAGNOSIS — Q79.62 HYPERMOBILE EHLERS-DANLOS SYNDROME: ICD-10-CM

## 2023-03-17 DIAGNOSIS — M47.812 CERVICAL SPINE ARTHRITIS: ICD-10-CM

## 2023-03-17 DIAGNOSIS — M79.641 PAIN IN BOTH HANDS: ICD-10-CM

## 2023-03-17 PROCEDURE — 99215 OFFICE O/P EST HI 40 MIN: CPT | Mod: VID | Performed by: PEDIATRICS

## 2023-03-17 NOTE — NURSING NOTE
Is the patient currently in the state of [place]? YES    Visit mode:VIDEO    If the visit is dropped, the patient can be reconnected by: VIDEO VISIT: Send to e-mail at: [email or web address]    Will anyone else be joining the visit? NO      How would you like to obtain your AVS? MyChart    Are changes needed to the allergy or medication list? NO    Reason for visit: Follow up and pain in mid back    Yoselin SMITH

## 2023-03-17 NOTE — PATIENT INSTRUCTIONS
Thank you for visiting the Primary Care Center today at the [facility]! The following is some information about our clinic:     Primary Care Center Frequently-Asked Questions    (1) How do I schedule appointments at the [facility]?     Primary Care--to schedule or make changes to an existing appointment, please call our primary care line at [phone].    Labs--to schedule a lab appointment at the [facility] you can use [facility] or call [phone]. If you have a [place] location that is closer to home, you can reach out to that location for scheduling options.     Imaging--if you need to schedule a CT, X-ray, MRI, ultrasound, or other imaging study you can call [phone] to schedule at the [facility] or any other [facility] imaging location.     Referrals--if a referral to another specialty was ordered you can expect a phone call from their scheduling team. If you have not heard from them in a week, please call us or send us a [facility] message to check the status or get a scheduling number. Please note that this only applies to internal [facility] referrals. If the referral is external you would need to contact their office for scheduling.     (2) I have a question about my visit, who do I contact?     You can call us at the primary care line at [phone] to ask questions about your visit. You can also send a secure message through [facility], which is reviewed by clinic staff. Please note that [facility] messages have a twenty-four to forty-eight business hour turnaround time and should not be used for urgent concerns.    (3) How will I get the results of my tests?    If you are signed up for whodoyout all tests will be released to you within twenty-four hours of resulting. Please allow three to five days for your doctor to review your results and place a note interpreting the results. If you do not have [employer] you will receive your  results through mail seven to ten business days following the return of the tests. Please note that if there should be any urgent or concerning results that your doctor or their registered nurse will reach out to you the same day as the tests come back. If you have follow up questions about your results or would like to discuss the results in detail please schedule a follow up with your provider either in person or virtually.     (4) How do I get refills of my prescriptions?     You should always first contact your pharmacy for refills of your medications. If submitting a refill request on [facility], please be sure to submit the request only once--repeat requests can cause delays in refill. If you are requesting a NEW medication or a medication related to new symptoms you will need to schedule an appointment with a provider prior to approval. Please note: Routine medication refills have up to one to three business day turnaround whereas controlled substances refills have up to five to seven business day turnaround.    (5) I have new symptoms, what do I do?     If you are having an immediate medical emergency, you should dial 911 for assistance.   For anything urgent that needs to be seen within a few hours to one day you should visit a local urgent care for assistance.  For non-urgent symptoms that need to be seen within a few days to a week you can schedule with an available provider in primary care by going to [facility] or calling [phone].   If you are not sure how serious your symptoms are or you would like to receive medical advice you can always call [phone] to speak with a triage nurse.
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
## 2023-03-18 NOTE — ASSESSMENT & PLAN NOTE
Autonomic  Comments about history, did water aerobics and kept up, but then was in bed for several days after that.  Had a tilt table test but ended up with a big bill afterwards.  Needs further discussion.

## 2023-03-18 NOTE — ASSESSMENT & PLAN NOTE
EDS  Silver splints order not clear about status, will try to move this ahead. ADDENDUM: ended up rewriting.
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
## 2023-03-18 NOTE — ASSESSMENT & PLAN NOTE
Thoracic back pain  Sometimes has pain when she turns her back, in middle of the spine (right more than left). Chiropractor  Wondered if there were more rib shifting.  Had a significant fall on ice, 'banana peel style' and mostly landed on her butt. Using smart crutches at times.  We talked about exercises, talking with FRANK Machuca.

## 2023-03-18 NOTE — ASSESSMENT & PLAN NOTE
Historical comment: She reported colon was said to be 'stretched,' checked with colonoscopy and had incident neuroendocrine tumor discovered. GI provider advised about diet changes and working on long-term.

## 2023-03-20 ENCOUNTER — HOSPITAL ENCOUNTER (OUTPATIENT)
Dept: PHYSICAL THERAPY | Facility: CLINIC | Age: 33
Setting detail: THERAPIES SERIES
Discharge: HOME | End: 2023-03-20
Attending: FAMILY MEDICINE
Payer: COMMERCIAL

## 2023-03-20 PROCEDURE — 97140 MANUAL THERAPY 1/> REGIONS: CPT | Mod: GP | Performed by: PHYSICAL THERAPIST

## 2023-03-20 PROCEDURE — 97110 THERAPEUTIC EXERCISES: CPT | Mod: GP | Performed by: PHYSICAL THERAPIST

## 2023-04-04 ENCOUNTER — HOSPITAL ENCOUNTER (OUTPATIENT)
Dept: PHYSICAL THERAPY | Facility: CLINIC | Age: 33
Setting detail: THERAPIES SERIES
Discharge: HOME | End: 2023-04-04
Attending: FAMILY MEDICINE
Payer: COMMERCIAL

## 2023-04-04 PROCEDURE — 97750 PHYSICAL PERFORMANCE TEST: CPT | Mod: GP | Performed by: PHYSICAL THERAPIST

## 2023-04-04 PROCEDURE — 97140 MANUAL THERAPY 1/> REGIONS: CPT | Mod: GP | Performed by: PHYSICAL THERAPIST

## 2023-04-04 PROCEDURE — 97110 THERAPEUTIC EXERCISES: CPT | Mod: GP | Performed by: PHYSICAL THERAPIST

## 2023-04-04 PROCEDURE — 97530 THERAPEUTIC ACTIVITIES: CPT | Mod: GP | Performed by: PHYSICAL THERAPIST
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
## 2023-04-11 ENCOUNTER — HOSPITAL ENCOUNTER (OUTPATIENT)
Dept: PHYSICAL THERAPY | Facility: CLINIC | Age: 33
Setting detail: THERAPIES SERIES
Discharge: HOME | End: 2023-04-11
Attending: FAMILY MEDICINE
Payer: COMMERCIAL

## 2023-04-11 PROCEDURE — 97530 THERAPEUTIC ACTIVITIES: CPT | Mod: GP | Performed by: PHYSICAL THERAPIST

## 2023-04-11 PROCEDURE — 97140 MANUAL THERAPY 1/> REGIONS: CPT | Mod: GP | Performed by: PHYSICAL THERAPIST

## 2023-04-18 ENCOUNTER — HOSPITAL ENCOUNTER (OUTPATIENT)
Dept: PHYSICAL THERAPY | Facility: CLINIC | Age: 33
Setting detail: THERAPIES SERIES
Discharge: HOME | End: 2023-04-18
Attending: FAMILY MEDICINE
Payer: COMMERCIAL

## 2023-04-18 PROCEDURE — 97110 THERAPEUTIC EXERCISES: CPT | Mod: GP | Performed by: PHYSICAL THERAPIST

## 2023-04-18 PROCEDURE — 97530 THERAPEUTIC ACTIVITIES: CPT | Mod: GP | Performed by: PHYSICAL THERAPIST

## 2023-04-25 ENCOUNTER — HOSPITAL ENCOUNTER (OUTPATIENT)
Dept: PHYSICAL THERAPY | Facility: CLINIC | Age: 33
Setting detail: THERAPIES SERIES
Discharge: HOME | End: 2023-04-25
Attending: FAMILY MEDICINE
Payer: COMMERCIAL

## 2023-04-25 PROCEDURE — 97110 THERAPEUTIC EXERCISES: CPT | Mod: GP | Performed by: PHYSICAL THERAPIST

## 2023-04-25 PROCEDURE — 97140 MANUAL THERAPY 1/> REGIONS: CPT | Mod: GP | Performed by: PHYSICAL THERAPIST

## 2023-05-03 ENCOUNTER — TRANSFERRED RECORDS (OUTPATIENT)
Dept: HEALTH INFORMATION MANAGEMENT | Facility: CLINIC | Age: 33
End: 2023-05-03
Payer: COMMERCIAL

## 2023-05-06 ENCOUNTER — OFFICE VISIT (OUTPATIENT)
Dept: INTERNAL MEDICINE | Facility: CLINIC | Age: 33
End: 2023-05-06
Payer: COMMERCIAL

## 2023-05-06 VITALS
DIASTOLIC BLOOD PRESSURE: 77 MMHG | SYSTOLIC BLOOD PRESSURE: 120 MMHG | WEIGHT: 126 LBS | OXYGEN SATURATION: 100 % | BODY MASS INDEX: 21.38 KG/M2 | HEART RATE: 87 BPM

## 2023-05-06 DIAGNOSIS — R20.8 DYSESTHESIA OF MULTIPLE SITES: ICD-10-CM

## 2023-05-06 DIAGNOSIS — R26.89 BALANCE PROBLEMS: ICD-10-CM

## 2023-05-06 DIAGNOSIS — M25.532 PAIN IN BOTH WRISTS: ICD-10-CM

## 2023-05-06 DIAGNOSIS — M25.531 PAIN IN BOTH WRISTS: ICD-10-CM

## 2023-05-06 DIAGNOSIS — R52 CHRONIC PAIN OF MULTIPLE SITES: ICD-10-CM

## 2023-05-06 DIAGNOSIS — G89.29 CHRONIC PAIN OF MULTIPLE SITES: ICD-10-CM

## 2023-05-06 DIAGNOSIS — M54.2 NECK PAIN: ICD-10-CM
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
## 2023-05-06 PROBLEM — G56.00 CARPAL TUNNEL SYNDROME: Status: RESOLVED | Noted: 2022-01-13 | Resolved: 2022-08-20

## 2023-05-06 PROBLEM — R20.2 PARESTHESIAS: Status: RESOLVED | Noted: 2023-02-01 | Resolved: 2023-05-06

## 2023-05-06 PROCEDURE — 99215 OFFICE O/P EST HI 40 MIN: CPT | Performed by: PEDIATRICS
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
## 2023-07-09 ENCOUNTER — HEALTH MAINTENANCE LETTER (OUTPATIENT)
Age: 33
End: 2023-07-09

## 2023-10-12 ENCOUNTER — TRANSCRIBE ORDERS (OUTPATIENT)
Dept: OTHER | Age: 33
End: 2023-10-12

## 2023-10-12 ENCOUNTER — TRANSCRIBE ORDERS (OUTPATIENT)
Dept: HEMATOLOGY | Facility: CLINIC | Age: 33
End: 2023-10-12
Payer: COMMERCIAL

## 2023-10-12 DIAGNOSIS — D69.9 BLEEDING DISORDER (H): Primary | ICD-10-CM

## 2023-10-20 ENCOUNTER — TELEPHONE (OUTPATIENT)
Dept: HEMATOLOGY | Facility: CLINIC | Age: 33
End: 2023-10-20
Payer: COMMERCIAL

## 2023-10-20 NOTE — TELEPHONE ENCOUNTER
[id number]  Yoly Adams  33 year old female  CBCD Diagnosis: EDS  CBCD Provider: Dr. Harrison    Incoming call from Ashia, care coordinator, at [facility]. Yoly has a history of a bleeding disorder NOS. Yoly will be having an endoscopic ultrasound with potential biopsies at [facility] on 11/17/23. GI team requesting hematology recommendations.     Patient was last seen by Dr. Harrison on 7/13/2022. RN called her. Yoly has not had any other bleeding symptoms or questions for Dr. Harrison, however she is willing to schedule if needed for recommendations.     RN will route to Dr. Harrison's care team to determine recommendations on scheduling and procedure plan. Ashia RN requests a call back at [phone] ext. [phone].     Chloe Chan RN, BSN, PCCN  Nurse Clinician    [facility]  [address] [place] [address], [address], [place] [address]   Office, direct: [phone]  Main office number: [phone]  Pronouns: She, her, hers

## 2023-10-26 ENCOUNTER — TELEPHONE (OUTPATIENT)
Dept: HEMATOLOGY | Facility: CLINIC | Age: 33
End: 2023-10-26
Payer: COMMERCIAL

## 2023-10-26 DIAGNOSIS — R23.3 EASY BRUISABILITY: Primary | ICD-10-CM

## 2023-10-26 RX ORDER — TRANEXAMIC ACID 650 MG/1
1300 TABLET ORAL 2 TIMES DAILY
Qty: 12 TABLET | Refills: 1 | Status: SHIPPED | OUTPATIENT
Start: 2023-10-26 | End: 2023-11-19

## 2023-10-27 NOTE — TELEPHONE ENCOUNTER
[id number]  Yoly Adams  33 year old female  CBCD Provider: Christy   CBCD Diagnosis: EDS, endoscopic ultrasound with potential biopsies at [facility] on 11/17/23.     Incoming call from Ashia, care coordinator, at [facility]. Yoly has a history of a bleeding disorder NOS. Yoly will be having an endoscopic ultrasound with potential biopsies at [facility] on 11/17/23. GI team requesting hematology recommendations.     Dr. Harrison is recommending 3 days of Lysteda, to start am of procedure. Notified patient of plan. Send script to pharmacy and called Ashia JOHNSTON at [facility]  back at [phone] ext. [phone] and left detailed voicemail.  Yeimi Quinteros, MSN, RN, PHN -Nurse Clinician, [facility] [phone]

## 2023-11-14 ENCOUNTER — VIRTUAL VISIT (OUTPATIENT)
Dept: HEMATOLOGY | Facility: CLINIC | Age: 33
End: 2023-11-14
Attending: PHYSICIAN ASSISTANT
Payer: COMMERCIAL

## 2023-11-14 DIAGNOSIS — R23.3 EASY BRUISABILITY: ICD-10-CM

## 2023-11-14 DIAGNOSIS — D69.9 BLEEDING DISORDER (H): ICD-10-CM

## 2023-11-14 PROCEDURE — 99215 OFFICE O/P EST HI 40 MIN: CPT | Mod: 95 | Performed by: PHYSICIAN ASSISTANT

## 2023-11-14 RX ORDER — CHLORHEXIDINE GLUCONATE ORAL RINSE 1.2 MG/ML
SOLUTION DENTAL
COMMUNITY
Start: 2023-08-28

## 2023-11-14 NOTE — PROGRESS NOTES
Patient was contacted to complete the pre-visit call prior to their video visit with the provider.      Allergies and medications were reviewed.    I thanked them for their time to cover this information     Belem Taylor CMA

## 2023-11-14 NOTE — PROGRESS NOTES
[facility]  [facility]  [facility] [address], [address], [place] [address]  Main: [phone], Fax: [phone]      Outpatient Visit Note:    Patient: Yoly Adams  MRN: [id number]  : 1990  KHALIDA: 2023    History of Present Illness:  Yoly Adams is a 33 year old female with a history of EDS that was previously evaluated by my colleague Dr. Harrison on 2022 for easy bruising. Her bleeding history was notable for easy bruising and vulvar hematoma after the delivery of her daughter in , but was otherwise largely negative. Her bleeding disorder work-up was negative. Her bruising pattern was felt to be 2/2 to her EDS with potential contribution of IgA vasculitis. Please refer to Dr. Harrison's consultation note for additional details. In the past, antifibrinolytics have been used surrounding surgeries/procedures to reduce bleeding risk. She presents today via video to  discuss two upcoming procedures. She has an upcoming gum grafting procedure. She is also receiving fertility care and is planning for an egg retrieval procedure in the coming weeks.    ROS:  Denies epistaxis, gingival bleeding, menorrhagia, hematuria, hematochezia, and melena.    Exam via Televideo:  Constitutional: Appears well, no distress.  Respiratory: Breathing comfortably on room air.  Neuro: Aox3.    Labs:  Component      Latest Ref Rng 2022  12:43 PM   INR      0.85 - 1.15  0.96    PTT      22 - 38 Seconds 28      Component      Latest Ref Rng 2022  2:52 PM   Factor 8 Assay      55 - 200 % 94    von Willebrand Factor Antigen      50 - 200 % 104    von Willebrand Factor Activity      50 - 180 % 89    von Willebrand Factor Multimers Normal multimeric distribution     Component      Latest Ref Rng 2022  12:43 PM   FACTOR 13 AGN SUBUNIT A      75 - 155 % 119    Fibrinogen      170 - 490 mg/dL 260        Assessment:  In summary, Yoly Adams is a  33 year old female with a history of EDS, easy bruising, and vulvar hematoma after the delivery of her daughter in 2021. Historical bleeding work-up has been negative and her bleeding diatheses have been felt largely 2/2 her EDS. She is managed with antifibrinolytics surrounding surgeries/procedures to reduce bleeding risk.    Plan:  1) For gum grafting procedure: TXA 1300mg PO BID x5-7 days post-op. Can take first dose prior to procedure.  2) For egg retrieval: TXA 1300mg PO BID x7-10 days post-op. Can take first dose prior to procedure.  3) Follow-up to discuss delivery planning/as needed in the future.    Video Call  Patient location: Home.  Provider location: [facility].  Joined the call at 11/14/2023, 10:01:11?am.  Left the call at 11/14/2023, 10:27:49?am.  You were on the call for 26 minutes 38 seconds .    40 minutes spent on the date of the encounter doing chart review, history and exam, documentation and further activities per the note.           Gladis Scott PA-C, [facility]  [facility]  [address] [place] [address], [address], [place] [address]  Main: [phone], Fax: [phone]

## 2023-11-16 ENCOUNTER — ANESTHESIA EVENT (OUTPATIENT)
Dept: GASTROENTEROLOGY | Facility: CLINIC | Age: 33
End: 2023-11-16
Payer: COMMERCIAL

## 2023-11-17 ENCOUNTER — HOSPITAL ENCOUNTER (OUTPATIENT)
Facility: CLINIC | Age: 33
Discharge: HOME | End: 2023-11-17
Attending: INTERNAL MEDICINE | Admitting: INTERNAL MEDICINE
Payer: COMMERCIAL

## 2023-11-17 ENCOUNTER — ANESTHESIA (OUTPATIENT)
Dept: GASTROENTEROLOGY | Facility: CLINIC | Age: 33
End: 2023-11-17
Payer: COMMERCIAL

## 2023-11-17 VITALS
OXYGEN SATURATION: 100 % | SYSTOLIC BLOOD PRESSURE: 106 MMHG | RESPIRATION RATE: 16 BRPM | HEIGHT: 64 IN | DIASTOLIC BLOOD PRESSURE: 75 MMHG | BODY MASS INDEX: 21.85 KG/M2 | WEIGHT: 128 LBS | HEART RATE: 66 BPM

## 2023-11-17 LAB — LOWER EUS: NORMAL

## 2023-11-17 PROCEDURE — 999N000010 HC STATISTIC ANES STAT CODE-CRNA PER MINUTE: Performed by: INTERNAL MEDICINE

## 2023-11-17 PROCEDURE — 45341 SIGMOIDOSCOPY W/ULTRASOUND: CPT | Performed by: INTERNAL MEDICINE

## 2023-11-17 PROCEDURE — 258N000003 HC RX IP 258 OP 636: Performed by: NURSE ANESTHETIST, CERTIFIED REGISTERED

## 2023-11-17 PROCEDURE — 250N000009 HC RX 250: Performed by: NURSE ANESTHETIST, CERTIFIED REGISTERED

## 2023-11-17 PROCEDURE — 370N000017 HC ANESTHESIA TECHNICAL FEE, PER MIN: Performed by: INTERNAL MEDICINE

## 2023-11-17 PROCEDURE — 88305 TISSUE EXAM BY PATHOLOGIST: CPT | Mod: TC | Performed by: INTERNAL MEDICINE

## 2023-11-17 PROCEDURE — 250N000013 HC RX MED GY IP 250 OP 250 PS 637: Performed by: INTERNAL MEDICINE

## 2023-11-17 PROCEDURE — 250N000011 HC RX IP 250 OP 636: Mod: JZ | Performed by: ANESTHESIOLOGY

## 2023-11-17 PROCEDURE — 45342 SIGMOIDOSCOPY W/US GUIDE BX: CPT | Mod: PT | Performed by: INTERNAL MEDICINE

## 2023-11-17 PROCEDURE — 250N000011 HC RX IP 250 OP 636: Performed by: NURSE ANESTHETIST, CERTIFIED REGISTERED

## 2023-11-17 PROCEDURE — 88305 TISSUE EXAM BY PATHOLOGIST: CPT | Mod: 26 | Performed by: PATHOLOGY

## 2023-11-17 RX ORDER — FLUMAZENIL 0.1 MG/ML
0.2 INJECTION, SOLUTION INTRAVENOUS
Status: DISCONTINUED | OUTPATIENT
Start: 2023-11-17 | End: 2023-11-17 | Stop reason: HOSPADM

## 2023-11-17 RX ORDER — ONDANSETRON 2 MG/ML
4 INJECTION INTRAMUSCULAR; INTRAVENOUS EVERY 6 HOURS PRN
Status: DISCONTINUED | OUTPATIENT
Start: 2023-11-17 | End: 2023-11-17 | Stop reason: HOSPADM

## 2023-11-17 RX ORDER — SODIUM CHLORIDE, SODIUM LACTATE, POTASSIUM CHLORIDE, CALCIUM CHLORIDE 600; 310; 30; 20 MG/100ML; MG/100ML; MG/100ML; MG/100ML
INJECTION, SOLUTION INTRAVENOUS CONTINUOUS PRN
Status: DISCONTINUED | OUTPATIENT
Start: 2023-11-17 | End: 2023-11-17

## 2023-11-17 RX ORDER — NALOXONE HYDROCHLORIDE 0.4 MG/ML
0.2 INJECTION, SOLUTION INTRAMUSCULAR; INTRAVENOUS; SUBCUTANEOUS
Status: DISCONTINUED | OUTPATIENT
Start: 2023-11-17 | End: 2023-11-17 | Stop reason: HOSPADM

## 2023-11-17 RX ORDER — PROPOFOL 10 MG/ML
INJECTION, EMULSION INTRAVENOUS CONTINUOUS PRN
Status: DISCONTINUED | OUTPATIENT
Start: 2023-11-17 | End: 2023-11-17

## 2023-11-17 RX ORDER — NALOXONE HYDROCHLORIDE 0.4 MG/ML
0.4 INJECTION, SOLUTION INTRAMUSCULAR; INTRAVENOUS; SUBCUTANEOUS
Status: DISCONTINUED | OUTPATIENT
Start: 2023-11-17 | End: 2023-11-17 | Stop reason: HOSPADM

## 2023-11-17 RX ORDER — LIDOCAINE 40 MG/G
CREAM TOPICAL
Status: DISCONTINUED | OUTPATIENT
Start: 2023-11-17 | End: 2023-11-17 | Stop reason: HOSPADM

## 2023-11-17 RX ORDER — ONDANSETRON 2 MG/ML
INJECTION INTRAMUSCULAR; INTRAVENOUS PRN
Status: DISCONTINUED | OUTPATIENT
Start: 2023-11-17 | End: 2023-11-17

## 2023-11-17 RX ORDER — ONDANSETRON 4 MG/1
4 TABLET, ORALLY DISINTEGRATING ORAL EVERY 6 HOURS PRN
Status: DISCONTINUED | OUTPATIENT
Start: 2023-11-17 | End: 2023-11-17 | Stop reason: HOSPADM

## 2023-11-17 RX ORDER — PROPOFOL 10 MG/ML
INJECTION, EMULSION INTRAVENOUS PRN
Status: DISCONTINUED | OUTPATIENT
Start: 2023-11-17 | End: 2023-11-17

## 2023-11-17 RX ORDER — DEXMEDETOMIDINE HYDROCHLORIDE 4 UG/ML
INJECTION, SOLUTION INTRAVENOUS PRN
Status: DISCONTINUED | OUTPATIENT
Start: 2023-11-17 | End: 2023-11-17

## 2023-11-17 RX ORDER — LIDOCAINE HYDROCHLORIDE 20 MG/ML
INJECTION, SOLUTION INFILTRATION; PERINEURAL PRN
Status: DISCONTINUED | OUTPATIENT
Start: 2023-11-17 | End: 2023-11-17

## 2023-11-17 RX ADMIN — SODIUM PHOSPHATE 1 ENEMA: 7; 19 ENEMA RECTAL at 13:26

## 2023-11-17 RX ADMIN — PROPOFOL 30 MG: 10 INJECTION, EMULSION INTRAVENOUS at 13:37

## 2023-11-17 RX ADMIN — FAMOTIDINE 20 MG: 10 INJECTION INTRAVENOUS at 13:27

## 2023-11-17 RX ADMIN — SODIUM CHLORIDE, POTASSIUM CHLORIDE, SODIUM LACTATE AND CALCIUM CHLORIDE: 600; 310; 30; 20 INJECTION, SOLUTION INTRAVENOUS at 13:34

## 2023-11-17 RX ADMIN — ONDANSETRON 4 MG: 2 INJECTION INTRAMUSCULAR; INTRAVENOUS at 13:39

## 2023-11-17 RX ADMIN — DEXMEDETOMIDINE HYDROCHLORIDE 12 MCG: 200 INJECTION INTRAVENOUS at 13:36

## 2023-11-17 RX ADMIN — LIDOCAINE HYDROCHLORIDE 50 MG: 20 INJECTION, SOLUTION INFILTRATION; PERINEURAL at 13:36

## 2023-11-17 RX ADMIN — PROPOFOL 200 MCG/KG/MIN: 10 INJECTION, EMULSION INTRAVENOUS at 13:36

## 2023-11-17 ASSESSMENT — ACTIVITIES OF DAILY LIVING (ADL)
ADLS_ACUITY_SCORE: 35
ADLS_ACUITY_SCORE: 35

## 2023-11-17 ASSESSMENT — ENCOUNTER SYMPTOMS
DYSRHYTHMIAS: 0
SEIZURES: 0

## 2023-11-17 ASSESSMENT — LIFESTYLE VARIABLES: TOBACCO_USE: 0

## 2023-11-17 NOTE — ANESTHESIA POSTPROCEDURE EVALUATION
Patient: Yoly Adams    Procedure: Procedure(s):  Endoscopic ultrasound with fine needle aspiration       Anesthesia Type:  MAC    Note:  Disposition: Outpatient   Postop Pain Control: Uneventful            Sign Out: Well controlled pain   PONV: No   Neuro/Psych: Uneventful            Sign Out: Acceptable/Baseline neuro status   Airway/Respiratory: Uneventful            Sign Out: Acceptable/Baseline resp. status   CV/Hemodynamics: Uneventful            Sign Out: Acceptable CV status   Other NRE: NONE   DID A NON-ROUTINE EVENT OCCUR? No           Last vitals:  Vitals:    11/17/23 1230   BP: 115/80   Pulse: 70   Resp: (!) 45   SpO2: 100%       Electronically Signed By: Jorden Paulino MD  November 17, 2023  2:16 PM

## 2023-11-17 NOTE — ANESTHESIA CARE TRANSFER NOTE
Patient: Yoly Adams    Procedure: Procedure(s):  Endoscopic ultrasound with fine needle aspiration       Diagnosis: Neuroendocrine tumor (H28) [D3A.8]  Diagnosis Additional Information: No value filed.    Anesthesia Type:   MAC     Note:    Oropharynx: oropharynx clear of all foreign objects and spontaneously breathing  Level of Consciousness: drowsy  Oxygen Supplementation: room air      Dentition: dentition unchanged  Vital Signs Stable: post-procedure vital signs reviewed and stable  Report to RN Given: handoff report given  Patient transferred to: PACU    Handoff Report: Identifed the Patient, Identified the Reponsible Provider, Reviewed the pertinent medical history, Discussed the surgical course, Reviewed Intra-OP anesthesia mangement and issues during anesthesia, Set expectations for post-procedure period and Allowed opportunity for questions and acknowledgement of understanding      Vitals:  Vitals Value Taken Time   BP 95/52    Temp     Pulse 55 11/17/23 1402   Resp 18 11/17/23 1402   SpO2 100 % 11/17/23 1402   Vitals shown include unfiled device data.    Electronically Signed By: CESILIA Ramos CRNA  November 17, 2023  2:03 PM
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
## 2023-11-19 RX ORDER — TRANEXAMIC ACID 650 MG/1
1300 TABLET ORAL 2 TIMES DAILY PRN
Qty: 40 TABLET | Refills: 1 | Status: SHIPPED | OUTPATIENT
Start: 2023-11-19

## 2023-11-20 ENCOUNTER — THERAPY VISIT (OUTPATIENT)
Dept: OCCUPATIONAL THERAPY | Facility: CLINIC | Age: 33
End: 2023-11-20
Payer: COMMERCIAL

## 2023-11-20 DIAGNOSIS — M79.641 PAIN IN BOTH HANDS: Primary | ICD-10-CM

## 2023-11-20 DIAGNOSIS — M79.642 PAIN IN BOTH HANDS: Primary | ICD-10-CM

## 2023-11-20 LAB
PATH REPORT.COMMENTS IMP SPEC: NORMAL
PATH REPORT.FINAL DX SPEC: NORMAL
PATH REPORT.GROSS SPEC: NORMAL
PATH REPORT.MICROSCOPIC SPEC OTHER STN: NORMAL
PATH REPORT.RELEVANT HX SPEC: NORMAL
PHOTO IMAGE: NORMAL

## 2023-11-20 PROCEDURE — 97535 SELF CARE MNGMENT TRAINING: CPT | Mod: GO | Performed by: OCCUPATIONAL THERAPIST
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
## 2023-12-05 ENCOUNTER — OFFICE VISIT (OUTPATIENT)
Dept: AUDIOLOGY | Facility: CLINIC | Age: 33
End: 2023-12-05
Payer: COMMERCIAL

## 2023-12-05 DIAGNOSIS — H90.11 CONDUCTIVE HEARING LOSS OF RIGHT EAR WITH UNRESTRICTED HEARING OF LEFT EAR: Primary | ICD-10-CM

## 2023-12-05 PROCEDURE — 92550 TYMPANOMETRY & REFLEX THRESH: CPT | Performed by: AUDIOLOGIST

## 2023-12-05 PROCEDURE — 92557 COMPREHENSIVE HEARING TEST: CPT | Performed by: AUDIOLOGIST

## 2023-12-05 NOTE — PROGRESS NOTES
AUDIOLOGY REPORT    SUBJECTIVE:  Yoly Adams is a 33 year old female who was seen in the Audiology Clinic at the [facility] for audiologic evaluation, referred by Self . The patient reports a perceived decline in right ear hearing for which she has been using an OTC hearing aid for the past year. The patient denies  bilateral tinnitus, bilateral otalgia, bilateral drainage, and bilateral aural fullness.  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  They were unaccompanied today.    OBJECTIVE:  Abuse Screening:  Do you feel unsafe at home or work/school? No  Do you feel threatened by someone? No  Does anyone try to keep you from having contact with others, or doing things outside of your home? No  Physical signs of abuse present? No     Fall Risk Screen:  1. Have you fallen two or more times in the past year? Yes  2. Have you fallen and had an injury in the past year? Yes    Timed Up and Go Score (in seconds): not tested  Is patient a fall risk? Yes  Referral initiated: No- patient has Balta-Danlos syndrome which causes the balance issues and her PCP is aware of this.  Fall Risk Assessment Completed by Audiology    Otoscopic exam indicates ears are clear of cerumen bilaterally     Pure Tone Thresholds assessed using conventional audiometry with good  reliability from 250-8000 Hz bilaterally using insert earphones and circumaural headphones     RIGHT:  borderline-normal from 250-500 Hz rising to normal hearing sensitivity with mild air-bone gap at 250 and 1000 Hz      LEFT:    normal hearing sensitivity      Tympanogram:    RIGHT: normal eardrum mobility    LEFT:   normal eardrum mobility    Reflexes (reported by stimulus ear):  RIGHT: Ipsilateral is present at normal  levels  RIGHT: Contralateral is elevated at frequencies tested  LEFT:   Ipsilateral is present at normal levels  LEFT:   Contralateral is elevated at frequencies tested      Speech Reception Threshold:    RIGHT: 20 dB HL    LEFT:   10 dB HL    Speech Reception Thresholds are in good agreement with pure tone thresholds.    Word Recognition Score:     RIGHT: 100% at 60 dB HL using NU-6 recorded word list.    LEFT:   100% at 50 dB HL using NU-6 recorded word list.      ASSESSMENT:     ICD-10-CM    1. Conductive hearing loss of right ear with unrestricted hearing of left ear  H90.11           Today s results were discussed with the patient in detail.     PLAN:  Patient was counseled regarding hearing loss and impact on communication.     It is recommended that the patient continue wearing her OTC hearing aid, as long as it is helping her. I also recommend she have her hearing rechecked in 2 years; or sooner if symptoms change.  Please call this clinic with questions regarding these results or recommendations.          Silverio Comer MA, CCC-A  [place] Licensed Audiologist #[id number]  12/5/2023

## 2023-12-14 ENCOUNTER — OFFICE VISIT (OUTPATIENT)
Dept: OBGYN | Facility: CLINIC | Age: 33
End: 2023-12-14
Attending: OBSTETRICS & GYNECOLOGY
Payer: COMMERCIAL

## 2023-12-14 VITALS
DIASTOLIC BLOOD PRESSURE: 82 MMHG | HEART RATE: 84 BPM | HEIGHT: 64 IN | BODY MASS INDEX: 22.36 KG/M2 | SYSTOLIC BLOOD PRESSURE: 123 MMHG | WEIGHT: 131 LBS

## 2023-12-14 DIAGNOSIS — Z31.69 ENCOUNTER FOR PRECONCEPTION CONSULTATION: Primary | ICD-10-CM

## 2023-12-14 PROCEDURE — 99213 OFFICE O/P EST LOW 20 MIN: CPT | Performed by: OBSTETRICS & GYNECOLOGY

## 2023-12-14 PROCEDURE — 99214 OFFICE O/P EST MOD 30 MIN: CPT | Performed by: OBSTETRICS & GYNECOLOGY

## 2023-12-14 ASSESSMENT — PATIENT HEALTH QUESTIONNAIRE - PHQ9
SUM OF ALL RESPONSES TO PHQ QUESTIONS 1-9: 5
5. POOR APPETITE OR OVEREATING: NEARLY EVERY DAY

## 2023-12-14 ASSESSMENT — ANXIETY QUESTIONNAIRES
6. BECOMING EASILY ANNOYED OR IRRITABLE: MORE THAN HALF THE DAYS
IF YOU CHECKED OFF ANY PROBLEMS ON THIS QUESTIONNAIRE, HOW DIFFICULT HAVE THESE PROBLEMS MADE IT FOR YOU TO DO YOUR WORK, TAKE CARE OF THINGS AT HOME, OR GET ALONG WITH OTHER PEOPLE: SOMEWHAT DIFFICULT
1. FEELING NERVOUS, ANXIOUS, OR ON EDGE: MORE THAN HALF THE DAYS
GAD7 TOTAL SCORE: 16
7. FEELING AFRAID AS IF SOMETHING AWFUL MIGHT HAPPEN: MORE THAN HALF THE DAYS
2. NOT BEING ABLE TO STOP OR CONTROL WORRYING: MORE THAN HALF THE DAYS
3. WORRYING TOO MUCH ABOUT DIFFERENT THINGS: MORE THAN HALF THE DAYS
5. BEING SO RESTLESS THAT IT IS HARD TO SIT STILL: NEARLY EVERY DAY
GAD7 TOTAL SCORE: 16

## 2023-12-14 NOTE — LETTER
"2023       RE: Yoly Adams  [address] [place] [address]     Dear Colleague,    Thank you for referring your patient, Yoly Adams, to the [facility] [place] at [facility]. Please see a copy of my visit note below.    S:  Pt is a 32 y/o  who presents today to learn more about what it would entail to have her next pregnancy via a gestational carrier.  She has a history of EDS, followed by Dr. Chavez.  She has a history of a prior term , however the pregnancy was very difficult for her.  When I saw her last in  she was still having lactational amenorrhea-her daughet was 17 months old at the time.  Her periods have resumed and are regular.  She feels like her EDS and other health conditions are in good control and overall she feels great.  She has a close friend who is a L&D nurse and has offered to be her gestational carrier.      O: /82   Pulse 84   Ht 1.626 m (5' 4\")   Wt 59.4 kg (131 lb)   LMP 2023   BMI 22.49 kg/m       Exam deferred    Pap screening and other HCM is up to date    A:  32 y/o  with a history of EDS who is considering a second pregnancy with a gestational carrier.        P:  Information given on local [facility] clinics.  She was encouraged to make a consult visit at one or more of them to get a feel for the clinic to see if it would be a good fit for them.  They can go over the process and cost in detail with the patient, her partner and proposed gestational carrier.   Advised that she will eventually need to find a  who can help with the process of adoption after birth as the birth mother will be the legal parent and decision maker for the baby until adoption.  Discussed that if she were to go this route, she can be in the delivery room/OR with her friend and would arrange for a separate room on the postpartum unit for her and her partner after delivery.  Her friend " would be able to have as much or as little contact with the baby after delivery as she/they are comfortable with.   She was happy with the above information and will reach out with other questions or to arrange prenatal care for her gestational carrier.     Rae Marquez MD, FACOG

## 2023-12-19 NOTE — PROGRESS NOTES
"S:  Pt is a 34 y/o  who presents today to learn more about what it would entail to have her next pregnancy via a gestational carrier.  She has a history of EDS, followed by Dr. Chavez.  She has a history of a prior term , however the pregnancy was very difficult for her.  When I saw her last in  she was still having lactational amenorrhea-her daughet was 17 months old at the time.  Her periods have resumed and are regular.  She feels like her EDS and other health conditions are in good control and overall she feels great.  She has a close friend who is a L&D nurse and has offered to be her gestational carrier.      O: /82   Pulse 84   Ht 1.626 m (5' 4\")   Wt 59.4 kg (131 lb)   LMP 2023   BMI 22.49 kg/m       Exam deferred    Pap screening and other HCM is up to date    A:  34 y/o  with a history of EDS who is considering a second pregnancy with a gestational carrier.        P:  Information given on local [facility] clinics.  She was encouraged to make a consult visit at one or more of them to get a feel for the clinic to see if it would be a good fit for them.  They can go over the process and cost in detail with the patient, her partner and proposed gestational carrier.   Advised that she will eventually need to find a  who can help with the process of adoption after birth as the birth mother will be the legal parent and decision maker for the baby until adoption.  Discussed that if she were to go this route, she can be in the delivery room/OR with her friend and would arrange for a separate room on the postpartum unit for her and her partner after delivery.  Her friend would be able to have as much or as little contact with the baby after delivery as she/they are comfortable with.   She was happy with the above information and will reach out with other questions or to arrange prenatal care for her gestational carrier.     Rae Marquez MD, FACOG      "

## 2024-01-10 ENCOUNTER — OFFICE VISIT (OUTPATIENT)
Dept: INTERNAL MEDICINE | Facility: CLINIC | Age: 34
End: 2024-01-10
Payer: COMMERCIAL

## 2024-01-10 VITALS
HEART RATE: 83 BPM | WEIGHT: 128.7 LBS | DIASTOLIC BLOOD PRESSURE: 75 MMHG | SYSTOLIC BLOOD PRESSURE: 114 MMHG | BODY MASS INDEX: 22.09 KG/M2 | OXYGEN SATURATION: 99 %

## 2024-01-10 DIAGNOSIS — Z13.220 LIPID SCREENING: ICD-10-CM

## 2024-01-10 DIAGNOSIS — R41.89 BRAIN FOG: ICD-10-CM

## 2024-01-10 DIAGNOSIS — Z13.1 DIABETES MELLITUS SCREENING: ICD-10-CM

## 2024-01-10 DIAGNOSIS — R40.0 SOMNOLENCE: ICD-10-CM

## 2024-01-10 DIAGNOSIS — Z82.49 FAMILY HISTORY OF AORTIC ANEURYSM: ICD-10-CM

## 2024-01-10 DIAGNOSIS — Z11.59 NEED FOR HEPATITIS C SCREENING TEST: ICD-10-CM

## 2024-01-10 DIAGNOSIS — Z11.4 SCREENING FOR HIV (HUMAN IMMUNODEFICIENCY VIRUS): ICD-10-CM

## 2024-01-10 DIAGNOSIS — Z12.4 CERVICAL CANCER SCREENING: ICD-10-CM

## 2024-01-10 DIAGNOSIS — Z00.00 PREVENTATIVE HEALTH CARE: Primary | ICD-10-CM

## 2024-01-10 PROCEDURE — 99395 PREV VISIT EST AGE 18-39: CPT | Performed by: PEDIATRICS

## 2024-01-10 NOTE — PROGRESS NOTES
Medicare Annual Wellness Questionnaire:  This 33 year old year old female presents for a Medicare Wellness Exam.    Providers/clinics involved in care:  Patient Care Team         Relationship Specialty Notifications Start End    Murray Tang MD PCP - General Internal Medicine - Pediatrics  12/16/22     Phone: [phone] Fax: [phone]         [address] [address]    Negar Sanchez MD MD Family Practice  1/11/16     Phone: [phone] Fax: [phone]         [address] [facility] [address]    Jesus Schmidt MD MD Otolaryngology  1/21/22     Phone: [phone] Fax: [phone]         [address] [place] [address] [address] [facility] [address]    Rae Marquez MD MD OB/Gyn  2/21/22     Phone: [phone] Fax: [phone]         [address] [facility] [address]    Adela Forrester MD MD Urology  2/22/22     Phone: [phone] Fax: [phone]         [address] [facility] [address] [facility] [address]    Marcos Morfin MD MD OB/Gyn  2/24/22     Phone: [phone] Fax: [phone]         [address] [facility] [address]    Murray Thomas PsyD Assigned Sleep Provider   6/18/22     Phone: [phone] Fax: [phone]         [address] [facility] [address]    Rae Marquez MD Assigned OBGYN Provider   7/2/22     Phone: [phone] Fax: [phone]         [address] [facility] [address]    Murray Tang MD MD Internal Medicine - Pediatrics  12/16/22     Phone: [phone] Fax: [phone]         [address] [address]    Zainab Melendrez MD MD Family Medicine  12/16/22     Phone: [phone] Fax: [phone]         [facility] [address] [facility] [address]    Murray Tang MD Assigned PCP   2/11/23     Phone: [phone] Fax: [phone]         [address] [address]     Gladis Scott PA-C Assigned Cancer Care Provider   11/25/23     Phone: [phone] Fax: [phone] [address] [facility] [address]            Fall Risk Assessment:    Have you fallen 2 or more times in the last year?  Yes     How many times were you injured due to a fall in the last year?  1 - bruising    PHQ-2:    Over the last 2 weeks, how often have you been bothered by feeling down, depressed, or hopeless?     Not at all (0)     Over the last 2 weeks, how often have you had little interest or pleasure in doing things?     Not at all (0)     Social History:    What is your marital status?      Who lives in your household?  , child, and three dogs    Does your home have loose rugs in the hallway:     No    Does your home have grab bars in the bathroom:    No    Does your home have handrails on the stairs?  Yes     Does your home have poorly lit areas?    Yes     Do you feel threatened or controlled by a partner, ex-partner or anyone in your life?   No    Has anyone hurt you physically, for example by pushing, hitting, slapping or kicking you   or forcing you to have sex?   No    Do you need help with the phone, transportation, shopping, preparing meals, housework, laundry, medications or managing money?   No    Sexual Health:    Are you sexually active?    No      Have you had any sexually transmitted infections in the last year?   No    Do you have any sexual concerns?    Yes - dyspareunia still persists 3 years after child birth    Women Only:    Women: What year did you stop having periods (approximate age)?  N/a    Women: Any vaginal bleeding in the last year?    N/a    Women: Have you ever had an abnormal Pap smear?    Yes 2018    General Health Assessment:    Have you noticed any hearing difficulties?   Yes     Do you wear hearing aids?   Yes     Have you seen a hearing professional such as an audiologist in the last 1 year?   Yes     Do you have vision difficulty?    Yes      Do you wear glasses or contacts?   Yes     Have you seen an eye doctor in the last 1 year?   Yes     How many servings of fruits and vegetables do you eat a day?  2    How often do you exercise in a week?  Active job 6 days a week    How long and what kind of exercise do you do?  Didn't answer    Tobacco and Alcohol History:    Do you use tobacco/nicotine products?    No    If yes, please list the method of use and average weekly consumption?  N/a    Do you use any other drugs?   No         Do you drink alcohol?   No    If you drink alcohol, how many drinks per week?  N/a      Advance Directive:    Have you completed an Advance Directives document?  No    If yes, have you given a copy to the clinic?   No    Do you need information on Advance Directives?   No        Yoly is a 33 year old that presents in clinic today for the following:     Chief Complaint   Patient presents with    Physical     Pt here for annual physical           1/10/2024     8:10 AM   Additional Questions   Roomed by MJL, EMT       Screenings from encounters over the past 10 days    No data recorded       Aiden Taylor at 8:12 AM on 1/10/2024

## 2024-01-10 NOTE — PROGRESS NOTES
"Dear patient. Thank you for visiting with me. I want you to feel respected, understood, and empowered. \"Respect\" is valuing you as much as I would a close family member. \"Empowerment\" happens when you are fully informed, and can make the best possible decision for you.  Please ask me questions!  Challenge anything that is not clear.    Medical records are primarily used as memory aids for me and my colleagues. Things to know about my documentation style:  - The 'problem list' includes current symptoms or diagnoses, and some problems that are resolved but may return. I use the past medical history for problems not expected to return.  - I use single quotation marks for things that you or I said, when I want to clarify who was speaking.  - I use double quotation marks when copying a term from elsewhere in your records. Italics (besides here) may also denote a quotation.  If you have questions or concerns, please contact me; I will reply as soon as time allows.    Subjective     Yoly Adams is a 33 year old woman, with concerns including:  Chief Complaint   Patient presents with    Physical     Pt here for annual physical     PCP: Murray Tang   Visit type: problem-oriented    Reviewed answers to preventive survey with patient (see documentation by rooming staff). We mostly focused on problem discussions.    Problem-oriented history  ------------------------------------------------------------------    FHX aneurysm in mom.        Objective     /75 (BP Location: Right arm, Patient Position: Sitting, Cuff Size: Adult Regular)   Pulse 83   Wt 58.4 kg (128 lb 11.2 oz)   LMP 11/13/2023   SpO2 99%   BMI 22.09 kg/m      Physical Exam  Constitutional:       General: She is not in acute distress.     Appearance: Normal appearance. She is not ill-appearing.   HENT:      Head: Normocephalic.      Nose: Nose normal.   Eyes:      General: No scleral icterus.        Right eye: No discharge.         Left " "eye: No discharge.      Extraocular Movements: Extraocular movements intact.   Pulmonary:      Effort: Pulmonary effort is normal. No respiratory distress.   Neurological:      Mental Status: She is alert.   Psychiatric:         Mood and Affect: Mood normal.         Behavior: Behavior normal.                  Assessment & Plan       Preventive-oriented section:    No other/new concerns identified on exam  Reviewed preventive health counseling  Immunization: reviewed timing  Screening for:       -- Elevated lipids and cardiovascular risk         -- Diabetes         -- Hepatitis C         -- HIV         -- Cervical cancer screening discussed  NILM 2022      Estimated body mass index is 22.09 kg/m  as calculated from the following:    Height as of 12/14/23: 1.626 m (5' 4\").    Weight as of this encounter: 58.4 kg (128 lb 11.2 oz).      Problem-oriented Assessment & Plan  ------------------------------------------------------------------    Problem List as of 1/10/2024 Reviewed: 5/6/2023  5:57 PM by Murray Tang MD            Noted    1. History of Brain fog 2020     Overview Signed 1/10/2024  8:38 AM by Murray Tang MD      Had difficulty for 4 months that affected her job, and then resolved.         2. Cervical cancer screening due 2027 1/10/2024     Overview Signed 1/10/2024  8:17 AM by Murray Tang MD      NILM (2/25/22), HPV neg 2/25/22         3. Family history of aortic aneurysm 1/10/2024     Overview Signed 1/10/2024  8:22 AM by Murray Tang MD      Mom          Last Assessment & Plan 1/10/2024 Office Visit Written 1/28/2024  5:13 PM by Murray Tang MD      With discussion about Fhx and patient's known EDS, decided to have evaluated by vasc/cardiology. Will need echo. Consider timing of CTA.          Relevant Orders     Adult Cardiology Eval  Referral     Ferritin insufficiency  Somnolence  Ferritin was modest. Sometimes has trouble sleeping, " although better since not working so many overnights. Saw a sleep specialist.  Doesn't snore, but  also YECENIA and therefore we cannot be sure.        About this visit:  Time note (e3, 20'): The total time (on the date of service) for this service was 28 minutes, including discussion/face-to-face, chart review, interpretation not otherwise reported, documentation, and updating of the computerized record.

## 2024-01-10 NOTE — ASSESSMENT & PLAN NOTE
Reviewed screening labs. See orders for plan.  [facility] on immunizations and pap smear.

## 2024-01-11 ENCOUNTER — LAB (OUTPATIENT)
Dept: LAB | Facility: CLINIC | Age: 34
End: 2024-01-11
Payer: COMMERCIAL

## 2024-01-11 DIAGNOSIS — Z11.4 SCREENING FOR HIV (HUMAN IMMUNODEFICIENCY VIRUS): ICD-10-CM

## 2024-01-11 DIAGNOSIS — R40.0 SOMNOLENCE: ICD-10-CM

## 2024-01-11 DIAGNOSIS — Z11.59 NEED FOR HEPATITIS C SCREENING TEST: ICD-10-CM

## 2024-01-11 DIAGNOSIS — Z13.220 LIPID SCREENING: ICD-10-CM

## 2024-01-11 DIAGNOSIS — Z13.1 DIABETES MELLITUS SCREENING: ICD-10-CM

## 2024-01-11 LAB
CHOLEST SERPL-MCNC: 165 MG/DL
FASTING STATUS PATIENT QL REPORTED: YES
FASTING STATUS PATIENT QL REPORTED: YES
FERRITIN SERPL-MCNC: 27 NG/ML (ref 6–175)
GLUCOSE SERPL-MCNC: 90 MG/DL (ref 70–99)
HCV AB SERPL QL IA: NONREACTIVE
HDLC SERPL-MCNC: 73 MG/DL
HIV 1+2 AB+HIV1 P24 AG SERPL QL IA: NONREACTIVE
HIV 1+2 AB+HIV1 P24 AG SERPL QL IA: NONREACTIVE
HOLD SPECIMEN: NORMAL
LDLC SERPL CALC-MCNC: 86 MG/DL
NONHDLC SERPL-MCNC: 92 MG/DL
TRIGL SERPL-MCNC: 29 MG/DL

## 2024-01-11 PROCEDURE — 82728 ASSAY OF FERRITIN: CPT | Performed by: PATHOLOGY

## 2024-01-11 PROCEDURE — 80061 LIPID PANEL: CPT | Performed by: PATHOLOGY

## 2024-01-11 PROCEDURE — 36415 COLL VENOUS BLD VENIPUNCTURE: CPT | Performed by: PATHOLOGY

## 2024-01-11 PROCEDURE — 86803 HEPATITIS C AB TEST: CPT | Performed by: PEDIATRICS

## 2024-01-11 PROCEDURE — 99000 SPECIMEN HANDLING OFFICE-LAB: CPT | Performed by: PATHOLOGY

## 2024-01-11 PROCEDURE — 87389 HIV-1 AG W/HIV-1&-2 AB AG IA: CPT | Performed by: PEDIATRICS

## 2024-01-11 PROCEDURE — 82947 ASSAY GLUCOSE BLOOD QUANT: CPT | Performed by: PATHOLOGY

## 2024-01-25 ENCOUNTER — MYC MEDICAL ADVICE (OUTPATIENT)
Dept: INTERNAL MEDICINE | Facility: CLINIC | Age: 34
End: 2024-01-25
Payer: COMMERCIAL

## 2024-01-25 NOTE — TELEPHONE ENCOUNTER
Faxed cardiology referral to:      Raymundo Nguyen MD   [address] [place] [facility]    [facility] [address]   Phone: [phone]   Fax: [phone]            Talha Lancaster CMA ([facility]) at 9:31 AM on 1/25/2024

## 2024-01-28 NOTE — ASSESSMENT & PLAN NOTE
With discussion about Fhx and patient's known EDS, decided to have evaluated by vasc/cardiology. Will need echo. Consider timing of CTA.

## 2024-01-28 NOTE — ASSESSMENT & PLAN NOTE
Ferritin was modest. Sometimes has trouble sleeping, although better since not working so many overnights. Saw a sleep specialist.  Doesn't snore, but  also YECENIA and therefore we cannot be sure.

## 2024-04-30 ENCOUNTER — TRANSFERRED RECORDS (OUTPATIENT)
Dept: HEALTH INFORMATION MANAGEMENT | Facility: CLINIC | Age: 34
End: 2024-04-30
Payer: COMMERCIAL

## 2024-08-31 ENCOUNTER — HEALTH MAINTENANCE LETTER (OUTPATIENT)
Age: 34
End: 2024-08-31

## 2024-09-04 ENCOUNTER — TRANSFERRED RECORDS (OUTPATIENT)
Dept: HEALTH INFORMATION MANAGEMENT | Facility: CLINIC | Age: 34
End: 2024-09-04
Payer: COMMERCIAL
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
## 2024-12-11 ENCOUNTER — LAB REQUISITION (OUTPATIENT)
Dept: LAB | Facility: CLINIC | Age: 34
End: 2024-12-11

## 2024-12-11 DIAGNOSIS — J02.9 ACUTE PHARYNGITIS, UNSPECIFIED: ICD-10-CM

## 2024-12-11 LAB
FLUAV RNA SPEC QL NAA+PROBE: NEGATIVE
FLUBV RNA RESP QL NAA+PROBE: NEGATIVE
RSV RNA SPEC NAA+PROBE: NEGATIVE
SARS-COV-2 RNA RESP QL NAA+PROBE: NEGATIVE

## 2024-12-11 PROCEDURE — 87081 CULTURE SCREEN ONLY: CPT | Performed by: FAMILY MEDICINE

## 2024-12-11 PROCEDURE — 87637 SARSCOV2&INF A&B&RSV AMP PRB: CPT | Performed by: FAMILY MEDICINE

## 2024-12-12 LAB — BACTERIA SPEC CULT: NORMAL

## 2024-12-14 LAB — BACTERIA SPEC CULT: NORMAL

## 2025-01-23 ENCOUNTER — TRANSFERRED RECORDS (OUTPATIENT)
Dept: HEALTH INFORMATION MANAGEMENT | Facility: CLINIC | Age: 35
End: 2025-01-23
Payer: COMMERCIAL

## 2025-04-14 ENCOUNTER — HOSPITAL ENCOUNTER (EMERGENCY)
Facility: CLINIC | Age: 35
Discharge: HOME | End: 2025-04-14
Attending: EMERGENCY MEDICINE
Payer: COMMERCIAL

## 2025-04-14 VITALS
HEART RATE: 78 BPM | RESPIRATION RATE: 16 BRPM | TEMPERATURE: 98.2 F | SYSTOLIC BLOOD PRESSURE: 104 MMHG | WEIGHT: 130 LBS | DIASTOLIC BLOOD PRESSURE: 67 MMHG | BODY MASS INDEX: 22.2 KG/M2 | OXYGEN SATURATION: 98 % | HEIGHT: 64 IN
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
## 2025-04-14 DIAGNOSIS — T19.2XXA RETAINED TAMPON, INITIAL ENCOUNTER: ICD-10-CM

## 2025-04-14 LAB
ALBUMIN SERPL BCG-MCNC: 4.6 G/DL (ref 3.5–5.2)
ALBUMIN UR-MCNC: NEGATIVE MG/DL
ALP SERPL-CCNC: 56 U/L (ref 40–150)
ALT SERPL W P-5'-P-CCNC: 27 U/L (ref 0–50)
ANION GAP SERPL CALCULATED.3IONS-SCNC: 11 MMOL/L (ref 7–15)
APPEARANCE UR: CLEAR
AST SERPL W P-5'-P-CCNC: 23 U/L (ref 0–45)
BASOPHILS # BLD AUTO: 0 10E3/UL (ref 0–0.2)
BASOPHILS NFR BLD AUTO: 1 %
BILIRUB SERPL-MCNC: 0.2 MG/DL
BILIRUB UR QL STRIP: NEGATIVE
BUN SERPL-MCNC: 16.5 MG/DL (ref 6–20)
CALCIUM SERPL-MCNC: 9.4 MG/DL (ref 8.8–10.4)
CHLORIDE SERPL-SCNC: 105 MMOL/L (ref 98–107)
CLUE CELLS: ABNORMAL
COLOR UR AUTO: ABNORMAL
CREAT SERPL-MCNC: 0.89 MG/DL (ref 0.51–0.95)
CRP SERPL-MCNC: <3 MG/L
EGFRCR SERPLBLD CKD-EPI 2021: 85 ML/MIN/1.73M2
EOSINOPHIL # BLD AUTO: 0.1 10E3/UL (ref 0–0.7)
EOSINOPHIL NFR BLD AUTO: 2 %
ERYTHROCYTE [DISTWIDTH] IN BLOOD BY AUTOMATED COUNT: 12.5 % (ref 10–15)
ERYTHROCYTE [SEDIMENTATION RATE] IN BLOOD BY WESTERGREN METHOD: 12 MM/HR (ref 0–20)
FLUAV RNA SPEC QL NAA+PROBE: NEGATIVE
FLUBV RNA RESP QL NAA+PROBE: NEGATIVE
GLUCOSE SERPL-MCNC: 118 MG/DL (ref 70–99)
GLUCOSE UR STRIP-MCNC: NEGATIVE MG/DL
HCO3 SERPL-SCNC: 21 MMOL/L (ref 22–29)
HCT VFR BLD AUTO: 32.9 % (ref 35–47)
HGB BLD-MCNC: 11.6 G/DL (ref 11.7–15.7)
HGB UR QL STRIP: ABNORMAL
HOLD SPECIMEN: NORMAL
HOLD SPECIMEN: NORMAL
IMM GRANULOCYTES # BLD: 0 10E3/UL
IMM GRANULOCYTES NFR BLD: 0 %
KETONES UR STRIP-MCNC: NEGATIVE MG/DL
LEUKOCYTE ESTERASE UR QL STRIP: ABNORMAL
LYMPHOCYTES # BLD AUTO: 2.2 10E3/UL (ref 0.8–5.3)
LYMPHOCYTES NFR BLD AUTO: 40 %
MCH RBC QN AUTO: 31.1 PG (ref 26.5–33)
MCHC RBC AUTO-ENTMCNC: 35.3 G/DL (ref 31.5–36.5)
MCV RBC AUTO: 88 FL (ref 78–100)
MONOCYTES # BLD AUTO: 0.3 10E3/UL (ref 0–1.3)
MONOCYTES NFR BLD AUTO: 6 %
MUCOUS THREADS #/AREA URNS LPF: PRESENT /LPF
NEUTROPHILS # BLD AUTO: 2.7 10E3/UL (ref 1.6–8.3)
NEUTROPHILS NFR BLD AUTO: 51 %
NITRATE UR QL: NEGATIVE
NRBC # BLD AUTO: 0 10E3/UL
NRBC BLD AUTO-RTO: 0 /100
PH UR STRIP: 5.5 [PH] (ref 5–7)
PLATELET # BLD AUTO: 217 10E3/UL (ref 150–450)
POTASSIUM SERPL-SCNC: 4 MMOL/L (ref 3.4–5.3)
PROCALCITONIN SERPL IA-MCNC: 0.03 NG/ML
PROT SERPL-MCNC: 7 G/DL (ref 6.4–8.3)
RBC # BLD AUTO: 3.73 10E6/UL (ref 3.8–5.2)
RBC URINE: 0 /HPF
RSV RNA SPEC NAA+PROBE: NEGATIVE
SARS-COV-2 RNA RESP QL NAA+PROBE: NEGATIVE
SODIUM SERPL-SCNC: 137 MMOL/L (ref 135–145)
SP GR UR STRIP: 1.01 (ref 1–1.03)
SQUAMOUS EPITHELIAL: 1 /HPF
TRICHOMONAS, WET PREP: ABNORMAL
UROBILINOGEN UR STRIP-MCNC: NORMAL MG/DL
WBC # BLD AUTO: 5.3 10E3/UL (ref 4–11)
WBC URINE: 9 /HPF
WBC'S/HIGH POWER FIELD, WET PREP: ABNORMAL
YEAST, WET PREP: ABNORMAL

## 2025-04-14 PROCEDURE — 87086 URINE CULTURE/COLONY COUNT: CPT | Performed by: EMERGENCY MEDICINE

## 2025-04-14 PROCEDURE — 85025 COMPLETE CBC W/AUTO DIFF WBC: CPT | Performed by: EMERGENCY MEDICINE

## 2025-04-14 PROCEDURE — 87210 SMEAR WET MOUNT SALINE/INK: CPT | Performed by: EMERGENCY MEDICINE

## 2025-04-14 PROCEDURE — 36415 COLL VENOUS BLD VENIPUNCTURE: CPT | Performed by: EMERGENCY MEDICINE

## 2025-04-14 PROCEDURE — 99283 EMERGENCY DEPT VISIT LOW MDM: CPT | Performed by: EMERGENCY MEDICINE

## 2025-04-14 PROCEDURE — 81003 URINALYSIS AUTO W/O SCOPE: CPT | Performed by: EMERGENCY MEDICINE

## 2025-04-14 PROCEDURE — 85652 RBC SED RATE AUTOMATED: CPT | Performed by: EMERGENCY MEDICINE

## 2025-04-14 PROCEDURE — 80053 COMPREHEN METABOLIC PANEL: CPT | Performed by: EMERGENCY MEDICINE

## 2025-04-14 PROCEDURE — 84145 PROCALCITONIN (PCT): CPT | Performed by: EMERGENCY MEDICINE

## 2025-04-14 PROCEDURE — 86140 C-REACTIVE PROTEIN: CPT | Performed by: EMERGENCY MEDICINE

## 2025-04-14 PROCEDURE — 87637 SARSCOV2&INF A&B&RSV AMP PRB: CPT | Performed by: EMERGENCY MEDICINE

## 2025-04-14 PROCEDURE — 250N000013 HC RX MED GY IP 250 OP 250 PS 637: Performed by: EMERGENCY MEDICINE

## 2025-04-14 PROCEDURE — 87040 BLOOD CULTURE FOR BACTERIA: CPT | Performed by: EMERGENCY MEDICINE

## 2025-04-14 PROCEDURE — 99284 EMERGENCY DEPT VISIT MOD MDM: CPT | Performed by: EMERGENCY MEDICINE

## 2025-04-14 PROCEDURE — 258N000003 HC RX IP 258 OP 636: Performed by: EMERGENCY MEDICINE

## 2025-04-14 RX ORDER — ACETAMINOPHEN 500 MG
1000 TABLET ORAL ONCE
Status: COMPLETED | OUTPATIENT
Start: 2025-04-14 | End: 2025-04-14

## 2025-04-14 RX ADMIN — ACETAMINOPHEN 1000 MG: 500 TABLET ORAL at 21:21

## 2025-04-14 RX ADMIN — SODIUM CHLORIDE 1000 ML: 9 INJECTION, SOLUTION INTRAVENOUS at 19:25

## 2025-04-14 ASSESSMENT — COLUMBIA-SUICIDE SEVERITY RATING SCALE - C-SSRS
2. HAVE YOU ACTUALLY HAD ANY THOUGHTS OF KILLING YOURSELF IN THE PAST MONTH?: NO
1. IN THE PAST MONTH, HAVE YOU WISHED YOU WERE DEAD OR WISHED YOU COULD GO TO SLEEP AND NOT WAKE UP?: NO
6. HAVE YOU EVER DONE ANYTHING, STARTED TO DO ANYTHING, OR PREPARED TO DO ANYTHING TO END YOUR LIFE?: NO

## 2025-04-14 ASSESSMENT — ACTIVITIES OF DAILY LIVING (ADL)
ADLS_ACUITY_SCORE: 41

## 2025-04-14 NOTE — ED TRIAGE NOTES
Patient reports headache for about 2 days, fever. Knows she realized she left tampon in from Tuesday night until today around 1800.    Hx of francesca albert

## 2025-04-15 LAB — BACTERIA UR CULT: NORMAL

## 2025-04-15 NOTE — ED PROVIDER NOTES
"ED Provider Note  [facility]      History     Chief Complaint   Patient presents with    Headache    Fever     HPI  Yoly Adams is a 35 year old female who presents to the emergency department for a headache and fever. The patient states that she forgot that she had a tampon in place since Tuesday of last week. She removed the tampon today around 6pm. She does report that the there was a bad smell when she removed the tampon. She has had a headache for the past 2 days. She does get headache at the base of her skull often due to chronic neck pain. This headache is different as the pain is in the front of her head. She did feel feverish today. She does note that she does no have consistence with her menstrual cycle and has only had 4 or 5 periods since she got pregnant 4 years ago. She did have abdominal cramping this weekned but though it may be related to her menstrual cycle. She has not taken any ibuprofen or Tylenol. She denies any rash, nausea, vomiting, discomfort with urination, diarrhea, cough, sore throat or runny nose.     {History Review Selection (Optional):411216}  {ROS Selection (Optional):467465}    Physical Exam   BP: (!) 127/94  Pulse: 81  Temp: 98.1  F (36.7  C)  Resp: 18  Height: 162.6 cm (5' 4\")  Weight: 59 kg (130 lb)  SpO2: 99 %  Physical Exam  ***    ED Course, Procedures, & Data      Procedures       {ED Course Selections (Optional):754934}  {ED Sepsis CMS Documentation (Optional):820370::\" \"}       Results for orders placed or performed during the hospital encounter of 04/14/25   Influenza A/B, RSV and SARS-CoV2 PCR (COVID-19) Nasopharyngeal     Status: Normal    Specimen: Nasopharyngeal; Swab   Result Value Ref Range    Influenza A PCR Negative Negative    Influenza B PCR Negative Negative    RSV PCR Negative Negative    SARS CoV2 PCR Negative Negative    Narrative    Testing was performed using the Xpert Xpress CoV2/Flu/RSV Assay on the [employer] " Instrument. This test should be ordered for the detection of SARS-CoV2, influenza, and RSV viruses in individuals with signs and symptoms of respiratory tract infection. This test is for in vitro diagnostic use under the US FDA for laboratories certified under CLIA to perform high or moderate complexity testing. This test has been US FDA cleared. A negative result does not rule out the presence of PCR inhibitors in the specimen or target RNA in concentration below the limit of detection for the assay. If only one viral target is positive but coinfection with multiple targets is suspected, the sample should be re-tested with another FDA cleared, approved, or authorized test, if coninfection would change clinical management. This test was validated by the [facility] [employer]. These laboratories are certified under the Clinical Laboratory Improvement Amendments of 1988 (CLIA-88) as qualified to perfom high complexity laboratory testing.   [place] Draw     Status: None    Narrative    The following orders were created for panel order [place] Draw.  Procedure                               Abnormality         Status                     ---------                               -----------         ------                     Extra Green Top (Lithiu...[[id number]]                      Final result               Extra Purple Top Tube[[id number]]                           Final result                 Please view results for these tests on the individual orders.   Extra Green Top (Lithium Heparin) Tube     Status: None   Result Value Ref Range    Hold Specimen JI    Extra Purple Top Tube     Status: None   Result Value Ref Range    Hold Specimen [facility]    Comprehensive metabolic panel     Status: Abnormal   Result Value Ref Range    Sodium 137 135 - 145 mmol/L    Potassium 4.0 3.4 - 5.3 mmol/L    Carbon Dioxide (CO2) 21 (L) 22 - 29 mmol/L    Anion Gap 11 7 - 15 mmol/L    Urea Nitrogen 16.5 6.0 - 20.0 mg/dL    Creatinine  0.89 0.51 - 0.95 mg/dL    GFR Estimate 85 >60 mL/min/1.73m2    Calcium 9.4 8.8 - 10.4 mg/dL    Chloride 105 98 - 107 mmol/L    Glucose 118 (H) 70 - 99 mg/dL    Alkaline Phosphatase 56 40 - 150 U/L    AST 23 0 - 45 U/L    ALT 27 0 - 50 U/L    Protein Total 7.0 6.4 - 8.3 g/dL    Albumin 4.6 3.5 - 5.2 g/dL    Bilirubin Total 0.2 <=1.2 mg/dL   CRP inflammation     Status: Normal   Result Value Ref Range    CRP Inflammation <3.00 <5.00 mg/L   Erythrocyte sedimentation rate auto     Status: Normal   Result Value Ref Range    Erythrocyte Sedimentation Rate 12 0 - 20 mm/hr   CBC with platelets and differential     Status: Abnormal   Result Value Ref Range    WBC Count 5.3 4.0 - 11.0 10e3/uL    RBC Count 3.73 (L) 3.80 - 5.20 10e6/uL    Hemoglobin 11.6 (L) 11.7 - 15.7 g/dL    Hematocrit 32.9 (L) 35.0 - 47.0 %    MCV 88 78 - 100 fL    MCH 31.1 26.5 - 33.0 pg    MCHC 35.3 31.5 - 36.5 g/dL    RDW 12.5 10.0 - 15.0 %    Platelet Count 217 150 - 450 10e3/uL    % Neutrophils 51 %    % Lymphocytes 40 %    % Monocytes 6 %    % Eosinophils 2 %    % Basophils 1 %    % Immature Granulocytes 0 %    NRBCs per 100 WBC 0 <1 /100    Absolute Neutrophils 2.7 1.6 - 8.3 10e3/uL    Absolute Lymphocytes 2.2 0.8 - 5.3 10e3/uL    Absolute Monocytes 0.3 0.0 - 1.3 10e3/uL    Absolute Eosinophils 0.1 0.0 - 0.7 10e3/uL    Absolute Basophils 0.0 0.0 - 0.2 10e3/uL    Absolute Immature Granulocytes 0.0 <=0.4 10e3/uL    Absolute NRBCs 0.0 10e3/uL   CBC with platelets differential     Status: Abnormal    Narrative    The following orders were created for panel order CBC with platelets differential.  Procedure                               Abnormality         Status                     ---------                               -----------         ------                     CBC with platelets and ...[[id number]]  Abnormal            Final result                 Please view results for these tests on the individual orders.     Medications   sodium chloride 0.9%  BOLUS 1,000 mL (1,000 mLs Intravenous $New Bag 4/14/25 5799)     Labs Ordered and Resulted from Time of ED Arrival to Time of ED Departure   COMPREHENSIVE METABOLIC PANEL - Abnormal       Result Value    Sodium 137      Potassium 4.0      Carbon Dioxide (CO2) 21 (*)     Anion Gap 11      Urea Nitrogen 16.5      Creatinine 0.89      GFR Estimate 85      Calcium 9.4      Chloride 105      Glucose 118 (*)     Alkaline Phosphatase 56      AST 23      ALT 27      Protein Total 7.0      Albumin 4.6      Bilirubin Total 0.2     CBC WITH PLATELETS AND DIFFERENTIAL - Abnormal    WBC Count 5.3      RBC Count 3.73 (*)     Hemoglobin 11.6 (*)     Hematocrit 32.9 (*)     MCV 88      MCH 31.1      MCHC 35.3      RDW 12.5      Platelet Count 217      % Neutrophils 51      % Lymphocytes 40      % Monocytes 6      % Eosinophils 2      % Basophils 1      % Immature Granulocytes 0      NRBCs per 100 WBC 0      Absolute Neutrophils 2.7      Absolute Lymphocytes 2.2      Absolute Monocytes 0.3      Absolute Eosinophils 0.1      Absolute Basophils 0.0      Absolute Immature Granulocytes 0.0      Absolute NRBCs 0.0     INFLUENZA A/B, RSV AND SARS-COV2 PCR - Normal    Influenza A PCR Negative      Influenza B PCR Negative      RSV PCR Negative      SARS CoV2 PCR Negative     CRP INFLAMMATION - Normal    CRP Inflammation <3.00     ERYTHROCYTE SEDIMENTATION RATE AUTO - Normal    Erythrocyte Sedimentation Rate 12     PROCALCITONIN   ROUTINE UA WITH MICROSCOPIC REFLEX TO CULTURE   BLOOD CULTURE     No orders to display          {Critical Care Performed?:112057}    Assessment & Plan    ***    I have reviewed the nursing notes. I have reviewed the findings, diagnosis, plan and need for follow up with the patient.    New Prescriptions    No medications on file       Final diagnoses:   None   I, Rossana Ugalde, am serving as a trained medical scribe to document services personally performed by Ted Mcclain MD, based on the provider's  statements to me.     I, Ted Mcclain MD, was physically present and have reviewed and verified the accuracy of this note documented by Rossana Ugalde.     Ted Mcclain MD  [facility] EMERGENCY DEPARTMENT  4/14/2025   Value Ref Range    Influenza A PCR Negative Negative    Influenza B PCR Negative Negative    RSV PCR Negative Negative    SARS CoV2 PCR Negative Negative    Narrative    Testing was performed using the Xpert Xpress CoV2/Flu/RSV Assay on the [employer] Instrument. This test should be ordered for the detection of SARS-CoV2, influenza, and RSV viruses in individuals with signs and symptoms of respiratory tract infection. This test is for in vitro diagnostic use under the US FDA for laboratories certified under CLIA to perform high or moderate complexity testing. This test has been US FDA cleared. A negative result does not rule out the presence of PCR inhibitors in the specimen or target RNA in concentration below the limit of detection for the assay. If only one viral target is positive but coinfection with multiple targets is suspected, the sample should be re-tested with another FDA cleared, approved, or authorized test, if coninfection would change clinical management. This test was validated by the [facility] [employer]. These laboratories are certified under the Clinical Laboratory Improvement Amendments of 1988 (CLIA-88) as qualified to perfom high complexity laboratory testing.   [place] Draw     Status: None    Narrative    The following orders were created for panel order [place] Draw.  Procedure                               Abnormality         Status                     ---------                               -----------         ------                     Extra Green Top (Lithiu...[[id number]]                      Final result               Extra Purple Top Tube[[id number]]                           Final result                 Please view results for these tests on the individual orders.   Extra Green Top (Lithium Heparin) Tube     Status: None   Result Value Ref Range    Hold Specimen JIC    Extra Purple Top Tube     Status: None   Result Value Ref Range    Hold Specimen [facility]IC     Comprehensive metabolic panel     Status: Abnormal   Result Value Ref Range    Sodium 137 135 - 145 mmol/L    Potassium 4.0 3.4 - 5.3 mmol/L    Carbon Dioxide (CO2) 21 (L) 22 - 29 mmol/L    Anion Gap 11 7 - 15 mmol/L    Urea Nitrogen 16.5 6.0 - 20.0 mg/dL    Creatinine 0.89 0.51 - 0.95 mg/dL    GFR Estimate 85 >60 mL/min/1.73m2    Calcium 9.4 8.8 - 10.4 mg/dL    Chloride 105 98 - 107 mmol/L    Glucose 118 (H) 70 - 99 mg/dL    Alkaline Phosphatase 56 40 - 150 U/L    AST 23 0 - 45 U/L    ALT 27 0 - 50 U/L    Protein Total 7.0 6.4 - 8.3 g/dL    Albumin 4.6 3.5 - 5.2 g/dL    Bilirubin Total 0.2 <=1.2 mg/dL   CRP inflammation     Status: Normal   Result Value Ref Range    CRP Inflammation <3.00 <5.00 mg/L   Erythrocyte sedimentation rate auto     Status: Normal   Result Value Ref Range    Erythrocyte Sedimentation Rate 12 0 - 20 mm/hr   CBC with platelets and differential     Status: Abnormal   Result Value Ref Range    WBC Count 5.3 4.0 - 11.0 10e3/uL    RBC Count 3.73 (L) 3.80 - 5.20 10e6/uL    Hemoglobin 11.6 (L) 11.7 - 15.7 g/dL    Hematocrit 32.9 (L) 35.0 - 47.0 %    MCV 88 78 - 100 fL    MCH 31.1 26.5 - 33.0 pg    MCHC 35.3 31.5 - 36.5 g/dL    RDW 12.5 10.0 - 15.0 %    Platelet Count 217 150 - 450 10e3/uL    % Neutrophils 51 %    % Lymphocytes 40 %    % Monocytes 6 %    % Eosinophils 2 %    % Basophils 1 %    % Immature Granulocytes 0 %    NRBCs per 100 WBC 0 <1 /100    Absolute Neutrophils 2.7 1.6 - 8.3 10e3/uL    Absolute Lymphocytes 2.2 0.8 - 5.3 10e3/uL    Absolute Monocytes 0.3 0.0 - 1.3 10e3/uL    Absolute Eosinophils 0.1 0.0 - 0.7 10e3/uL    Absolute Basophils 0.0 0.0 - 0.2 10e3/uL    Absolute Immature Granulocytes 0.0 <=0.4 10e3/uL    Absolute NRBCs 0.0 10e3/uL   CBC with platelets differential     Status: Abnormal    Narrative    The following orders were created for panel order CBC with platelets differential.  Procedure                               Abnormality         Status                      ---------                               -----------         ------                     CBC with platelets and ...[[id number]]  Abnormal            Final result                 Please view results for these tests on the individual orders.     Medications   sodium chloride 0.9% BOLUS 1,000 mL (1,000 mLs Intravenous $New Bag 4/14/25 0627)     Labs Ordered and Resulted from Time of ED Arrival to Time of ED Departure   COMPREHENSIVE METABOLIC PANEL - Abnormal       Result Value    Sodium 137      Potassium 4.0      Carbon Dioxide (CO2) 21 (*)     Anion Gap 11      Urea Nitrogen 16.5      Creatinine 0.89      GFR Estimate 85      Calcium 9.4      Chloride 105      Glucose 118 (*)     Alkaline Phosphatase 56      AST 23      ALT 27      Protein Total 7.0      Albumin 4.6      Bilirubin Total 0.2     CBC WITH PLATELETS AND DIFFERENTIAL - Abnormal    WBC Count 5.3      RBC Count 3.73 (*)     Hemoglobin 11.6 (*)     Hematocrit 32.9 (*)     MCV 88      MCH 31.1      MCHC 35.3      RDW 12.5      Platelet Count 217      % Neutrophils 51      % Lymphocytes 40      % Monocytes 6      % Eosinophils 2      % Basophils 1      % Immature Granulocytes 0      NRBCs per 100 WBC 0      Absolute Neutrophils 2.7      Absolute Lymphocytes 2.2      Absolute Monocytes 0.3      Absolute Eosinophils 0.1      Absolute Basophils 0.0      Absolute Immature Granulocytes 0.0      Absolute NRBCs 0.0     INFLUENZA A/B, RSV AND SARS-COV2 PCR - Normal    Influenza A PCR Negative      Influenza B PCR Negative      RSV PCR Negative      SARS CoV2 PCR Negative     CRP INFLAMMATION - Normal    CRP Inflammation <3.00     ERYTHROCYTE SEDIMENTATION RATE AUTO - Normal    Erythrocyte Sedimentation Rate 12     PROCALCITONIN   ROUTINE UA WITH MICROSCOPIC REFLEX TO CULTURE   BLOOD CULTURE     No orders to display          Assessment & Plan      35 year old female who presents to the emergency department for a headache and fever.  Vital signs stable afebrile  including normal pulse ox at 99% on room air.  COVID-19 and influenza swabs negative.  IV established, labs sent which revealed normal CBC normal electrolytes normal inflammatory markers bland UA.  Patient self swabbed with wet prep revealing no clue cells.  Case discussed with GYN who agreed no further evaluation necessary at this time.  Plan to follow-up with primary care within the next 5 to 7 days for further evaluation and care.    I have reviewed the nursing notes. I have reviewed the findings, diagnosis, plan and need for follow up with the patient.    New Prescriptions    No medications on file       Final diagnoses:   Retained tampon, initial encounter   IRossana, am serving as a trained medical scribe to document services personally performed by Ted Mcclain MD, based on the provider's statements to me.     ITed MD, was physically present and have reviewed and verified the accuracy of this note documented by Rossana Ugalde.     Ted Mcclain MD  [facility] EMERGENCY DEPARTMENT  4/14/2025     Ted Mcclain MD  04/29/25 2737

## 2025-04-17 LAB — BACTERIA BLD CULT: NORMAL

## 2025-04-19 LAB — BACTERIA BLD CULT: NO GROWTH

## (undated) DEVICE — SOL WATER IRRIG 1000ML BOTTLE 2F7114

## (undated) DEVICE — DECANTER BAG 2002S